# Patient Record
Sex: MALE | Race: WHITE | NOT HISPANIC OR LATINO | Employment: FULL TIME | URBAN - METROPOLITAN AREA
[De-identification: names, ages, dates, MRNs, and addresses within clinical notes are randomized per-mention and may not be internally consistent; named-entity substitution may affect disease eponyms.]

---

## 2018-05-11 ENCOUNTER — INITIAL CONSULT (OUTPATIENT)
Dept: VASCULAR SURGERY | Facility: CLINIC | Age: 68
End: 2018-05-11
Payer: MEDICARE

## 2018-05-11 VITALS
WEIGHT: 131.19 LBS | HEART RATE: 98 BPM | SYSTOLIC BLOOD PRESSURE: 155 MMHG | BODY MASS INDEX: 19.43 KG/M2 | DIASTOLIC BLOOD PRESSURE: 95 MMHG | HEIGHT: 69 IN | TEMPERATURE: 98 F

## 2018-05-11 DIAGNOSIS — Z01.818 PRE-OP EVALUATION: Primary | ICD-10-CM

## 2018-05-11 DIAGNOSIS — N18.4 CKD (CHRONIC KIDNEY DISEASE) STAGE 4, GFR 15-29 ML/MIN: ICD-10-CM

## 2018-05-11 DIAGNOSIS — I71.40 ABDOMINAL AORTIC ANEURYSM (AAA) WITHOUT RUPTURE: ICD-10-CM

## 2018-05-11 DIAGNOSIS — F17.210 CIGARETTE NICOTINE DEPENDENCE WITHOUT COMPLICATION: ICD-10-CM

## 2018-05-11 DIAGNOSIS — I71.40 AAA (ABDOMINAL AORTIC ANEURYSM) WITHOUT RUPTURE: Primary | ICD-10-CM

## 2018-05-11 DIAGNOSIS — I71.23 DESCENDING THORACIC AORTIC ANEURYSM: ICD-10-CM

## 2018-05-11 DIAGNOSIS — I10 ESSENTIAL HYPERTENSION: ICD-10-CM

## 2018-05-11 DIAGNOSIS — I71.20 THORACIC AORTIC ANEURYSM WITHOUT RUPTURE: Primary | ICD-10-CM

## 2018-05-11 PROCEDURE — 99205 OFFICE O/P NEW HI 60 MIN: CPT | Mod: S$PBB,,, | Performed by: SURGERY

## 2018-05-11 PROCEDURE — 99203 OFFICE O/P NEW LOW 30 MIN: CPT | Mod: PBBFAC | Performed by: SURGERY

## 2018-05-11 PROCEDURE — 99999 PR PBB SHADOW E&M-NEW PATIENT-LVL III: CPT | Mod: PBBFAC,,, | Performed by: SURGERY

## 2018-05-11 RX ORDER — LISINOPRIL 10 MG/1
10 TABLET ORAL
COMMUNITY
Start: 2017-08-01 | End: 2018-08-01

## 2018-05-11 RX ORDER — LIDOCAINE HYDROCHLORIDE 10 MG/ML
1 INJECTION, SOLUTION EPIDURAL; INFILTRATION; INTRACAUDAL; PERINEURAL ONCE
Status: CANCELLED | OUTPATIENT
Start: 2018-05-11 | End: 2018-05-11

## 2018-05-11 RX ORDER — MUPIROCIN 20 MG/G
OINTMENT TOPICAL
Status: CANCELLED | OUTPATIENT
Start: 2018-05-11

## 2018-05-11 NOTE — LETTER
May 11, 2018      Edda Gomez NP  [address] [facility] [place] [address]           [facility] - Vascular Surgery  [address]  [place] [address]  Phone: [phone]  Fax: [phone]          Patient: Oli Chris   MR Number: [id number]   YOB: 1950   Date of Visit: 5/11/2018       Dear Edda Gomez:    Thank you for referring Oli Chris to me for evaluation. Attached you will find relevant portions of my assessment and plan of care.    If you have questions, please do not hesitate to call me. I look forward to following Oli Chris along with you.    Sincerely,    LUIS ALBERTO Moss III, MD    Enclosure  CC:  No Recipients    If you would like to receive this communication electronically, please contact [email or web address] or [phone] to request more information on [employer] access.    For providers and/or their staff who would like to refer a patient to [facility], please contact us through our one-stop-shop provider referral line, [facility], at [phone].    If you feel you have received this communication in error or would no longer like to receive these types of communications, please e-mail [email or web address]

## 2018-05-11 NOTE — PROGRESS NOTES
lOi GALEANO Aries  05/11/2018     Referred by:  BRANDO Gomez (Vascular surgeon, [facility], [place])_    HPI:  Patient is a 68 y.o. male with a h/o HTN, chronic back pain, tobacco abuse who is here today for evaluation of synchronous thoracic and aortic aneurysm.   Patient visited PCP with throbbing lower back pain. He was evaluated with plain film and incidentally found to have thoracic aortic aneurysm.   Seen by Dr. Gomez in [place] and was referred for evaluation of 8.5 cm thoracic aortic aneurysm. Patient denies upper back pain.     Patient was evaluated with non-contrasted CT due to Cr. Of 1.9.    Notably, this throbbing lumbar back pain has been present ~6-8 wks, sometimes radiating down the R leg.  Has been medicating with high dose NSAIDS.   Also drips several POTS of cofee daily.    Works as .     no MI/stroke  Tobacco use: 1 PPD (50 PY)    PMH: HTN, tobacco abuse;   No hx of CAD/CP/MI    PSH: Tonsillectomy      No family history on file.  Social History     Social History    Marital status:      Spouse name: N/A    Number of children: N/A    Years of education: N/A     Occupational History    Not on file.     Social History Main Topics    Smoking status: Current Every Day Smoker     Types: Cigarettes    Smokeless tobacco: Not on file    Alcohol use No    Drug use: No    Sexual activity: Not Currently     Other Topics Concern    Not on file     Social History Narrative    No narrative on file     Review of patient's allergies indicates:  None      Current Outpatient Prescriptions:     lisinopril 10 MG tablet, Take 10 mg by mouth., Disp: , Rfl:     REVIEW OF SYSTEMS:  General: negative;   ENT: negative;   Allergy and Immunology: negative;   Hematological and Lymphatic: Negative;   Endocrine: negative;   Respiratory: no cough, shortness of breath, or wheezing;   Cardiovascular: no chest pain or dyspnea on exertion;   Gastrointestinal: no abdominal pain/back, change in bowel  habits, or bloody stools;   Genito-Urinary: no dysuria, trouble voiding, or hematuria;   Musculoskeletal: negative  Neurological: no TIA or stroke symptoms;   Psychiatric: no nervousness, anxiety or depression.    PHYSICAL EXAM:   Right Arm BP - Sittin/93 (18 0934)  Left Arm BP - Sittin/95 (18 0934)  Pulse: 98  Temp: 97.8 °F (36.6 °C)      General appearance:  Alert, well-appearing, and in no distress.  Oriented to person, place, and time   Neurological:  Normal speech, no focal findings noted; CN II - XII grossly intact           Musculoskeletal: Digits/nail without cyanosis/clubbing.  Normal muscle strength/tone.                 Neck: Supple, no significant adenopathy; thyroid is not enlarged      No carotid bruit can be auscultated                Chest:  Clear to auscultation, no wheezes, rales or rhonchi, symmetric air entry      No use of accessory muscles             Cardiac: Normal rate and regular rhythm, S1 and S2 normal; PMI non-displaced          Abdomen: Soft, nontender, nondistended, no masses or organomegaly      No rebound tenderness noted; bowel sounds normal      Pulsatile aortic mass is palpable.      No groin adenopathy      Extremities:   2+ femoral pulses bilaterally      Prominent bilateral popliteal artery pulses.      Bilateral pedal pulses palpable.      No pre-tibial edema      No ulcerations    LAB RESULTS: Outside Cr 1.9    IMAGING:  CT chest/abd/pelv non-contrasted - reviewed  Multiple synchronous aneurysms  ~9cm proximal descending thoracic aortic aneurysm   ~4cm saccular aneurysm at the level of diaphragmatic thu  ~5cm infrarenal aortic aneurysm with small right KRISTAN aneurysm.    IMP/PLAN:  68 y.o. male with HTN, Tobacco abuse with incidental finding of multiple synchronous aneurysm as mentioned above. Unfortunately due to his Cr. Of 1.9, these scans were done without contrast.     Phoenix Chang MD  Vascular/Endovascular Surgery Fellow    VASCULAR STAFF    I have  personally taken the history and examined this patient and agree with the resident's note as stated above.  Outside non-contrast CT personally reviewed    67 y/o male with synchronous upper mid thoracic fusiform aneurysm (9cm), much smaller terminal somewhat saccular aneurysm (~4-5cm), and 5.1 cm juxtrarenal AAA.  These are Asx.  His lumbar back pain with leg radiation most c/w musculoskeletal etiol.   There appears to be adequate landing zone prox and distal of the large TAA for endovascular Rx, but need to ensure with a cta.    Would not suggest Rx of all 3 aneurysms with a single open type II TAAA approach.    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  The significant renal dysfunction may be from his high dose NSAID use.    A/  Stop all NSAIDS and dehydrating major coffee intake, signif oral hydration with h2o over next week, then Re check Cr in ~ 1 wk in [facility].   Improvement of his renal insufficiency will make his surgery safer.  Tent plan CTA Chest/Abd Pelvis 5/22/18 if Cr has improved  Tentatively plan TEVAR 5/30/18    Over 45 min spent with the patient and wife    JHOAN Moss III, MD, FACS  Professor and Chief, Vascular and Endovascular Surgery

## 2018-05-21 ENCOUNTER — TELEPHONE (OUTPATIENT)
Dept: VASCULAR SURGERY | Facility: CLINIC | Age: 68
End: 2018-05-21

## 2018-05-21 NOTE — TELEPHONE ENCOUNTER
Reported BMP results to Dr. Moss. Dr. Moss ordered CTA to be changed to non-contrast CT chest/abd/pelvis. Notified patient's wife of change in orders, but that imaging time will remain at 1:45.     ----- Message from Ezra Ravi sent at 5/21/2018 12:05 PM CDT -----  Contact: Quinn- Wife  Caller said pt had blood work done on last Friday. Caller wants to know if the results show pt can have the CT scan tomorrow. Please call regarding this at [phone]

## 2018-05-22 ENCOUNTER — OFFICE VISIT (OUTPATIENT)
Dept: VASCULAR SURGERY | Facility: CLINIC | Age: 68
End: 2018-05-22
Payer: COMMERCIAL

## 2018-05-22 ENCOUNTER — HOSPITAL ENCOUNTER (OUTPATIENT)
Dept: RADIOLOGY | Facility: HOSPITAL | Age: 68
Discharge: HOME | End: 2018-05-22
Attending: SURGERY
Payer: COMMERCIAL

## 2018-05-22 ENCOUNTER — HOSPITAL ENCOUNTER (OUTPATIENT)
Dept: CARDIOLOGY | Facility: CLINIC | Age: 68
Discharge: HOME | End: 2018-05-22
Payer: COMMERCIAL

## 2018-05-22 VITALS
HEIGHT: 69 IN | HEART RATE: 92 BPM | DIASTOLIC BLOOD PRESSURE: 78 MMHG | TEMPERATURE: 98 F | WEIGHT: 131.19 LBS | BODY MASS INDEX: 19.43 KG/M2 | SYSTOLIC BLOOD PRESSURE: 125 MMHG

## 2018-05-22 DIAGNOSIS — I71.40 ABDOMINAL AORTIC ANEURYSM (AAA) WITHOUT RUPTURE: ICD-10-CM

## 2018-05-22 DIAGNOSIS — Z01.818 PRE-OP EVALUATION: Primary | ICD-10-CM

## 2018-05-22 DIAGNOSIS — I71.20 THORACIC AORTIC ANEURYSM WITHOUT RUPTURE: Primary | ICD-10-CM

## 2018-05-22 DIAGNOSIS — Z01.818 PRE-OP EVALUATION: ICD-10-CM

## 2018-05-22 DIAGNOSIS — J43.8 OTHER EMPHYSEMA: ICD-10-CM

## 2018-05-22 PROBLEM — J43.9 PULMONARY EMPHYSEMA: Status: ACTIVE | Noted: 2018-05-22

## 2018-05-22 PROCEDURE — 99215 OFFICE O/P EST HI 40 MIN: CPT | Mod: S$GLB,,, | Performed by: SURGERY

## 2018-05-22 PROCEDURE — 71250 CT THORAX DX C-: CPT | Mod: 26,,, | Performed by: RADIOLOGY

## 2018-05-22 PROCEDURE — 99999 PR PBB SHADOW E&M-EST. PATIENT-LVL III: CPT | Mod: PBBFAC,,, | Performed by: SURGERY

## 2018-05-22 PROCEDURE — 74176 CT ABD & PELVIS W/O CONTRAST: CPT | Mod: 26,,, | Performed by: RADIOLOGY

## 2018-05-22 PROCEDURE — 71046 X-RAY EXAM CHEST 2 VIEWS: CPT | Mod: 26,,, | Performed by: RADIOLOGY

## 2018-05-22 PROCEDURE — 74176 CT ABD & PELVIS W/O CONTRAST: CPT | Mod: TC

## 2018-05-22 PROCEDURE — 93000 ELECTROCARDIOGRAM COMPLETE: CPT | Mod: S$GLB,,, | Performed by: INTERNAL MEDICINE

## 2018-05-22 PROCEDURE — 71046 X-RAY EXAM CHEST 2 VIEWS: CPT | Mod: TC

## 2018-05-22 PROCEDURE — 71250 CT THORAX DX C-: CPT | Mod: TC

## 2018-05-22 RX ORDER — HYDROCODONE BITARTRATE AND ACETAMINOPHEN 7.5; 325 MG/1; MG/1
1 TABLET ORAL EVERY 6 HOURS PRN
Status: ON HOLD | COMMUNITY
End: 2018-06-02 | Stop reason: HOSPADM

## 2018-05-22 RX ORDER — TIOTROPIUM BROMIDE 18 UG/1
18 CAPSULE ORAL; RESPIRATORY (INHALATION) DAILY
COMMUNITY

## 2018-05-22 NOTE — PROGRESS NOTES
Oli GALEANO Aries  05/22/2018     Referred by:  BRANDO Gomez (Vascular surgeon, [facility], [place])_    HPI:  Patient is a 68 y.o. male with a h/o HTN, chronic back pain, tobacco abuse who is here today for evaluation of synchronous thoracic and aortic aneurysm.   Patient visited PCP with throbbing lower back pain. He was evaluated with plain film and incidentally found to have thoracic aortic aneurysm.   Seen by Dr. Gomez in [place] and was referred for evaluation of 8.5 cm thoracic aortic aneurysm. Patient denies upper back pain.     Patient was evaluated with non-contrasted CT due to Cr. Of 1.9.    Notably, this throbbing lumbar back pain has been present ~6-8 wks, sometimes radiating down the R leg.  Has been medicating with high dose NSAIDS.   Also drips several POTS of cofee daily.    Works as .     He now returns after stopping all NSAIDS x 10 days.  Repeat Cr 2.1 (outside)    no MI/stroke  Tobacco use: 1 PPD (50 PY)    PMH: HTN, tobacco abuse;   No hx of CAD/CP/MI    PSH: Tonsillectomy      No family history on file.  Social History     Social History    Marital status:      Spouse name: N/A    Number of children: N/A    Years of education: N/A     Occupational History    Not on file.     Social History Main Topics    Smoking status: Current Every Day Smoker     Types: Cigarettes    Smokeless tobacco: Not on file    Alcohol use No    Drug use: No    Sexual activity: Not Currently     Other Topics Concern    Not on file     Social History Narrative    No narrative on file     Review of patient's allergies indicates:  None      Current Outpatient Prescriptions:     HYDROcodone-acetaminophen (NORCO) 7.5-325 mg per tablet, Take 1 tablet by mouth every 6 (six) hours as needed for Pain., Disp: , Rfl:     tiotropium (SPIRIVA) 18 mcg inhalation capsule, Inhale 18 mcg into the lungs once daily. Controller, Disp: , Rfl:     lisinopril 10 MG tablet, Take 10 mg by mouth., Disp: , Rfl:      REVIEW OF SYSTEMS:  General: negative;   ENT: negative;   Allergy and Immunology: negative;   Hematological and Lymphatic: Negative;   Endocrine: negative;   Respiratory: no cough, shortness of breath, or wheezing;   Cardiovascular: no chest pain or dyspnea on exertion;   Gastrointestinal: no abdominal pain/back, change in bowel habits, or bloody stools;   Genito-Urinary: no dysuria, trouble voiding, or hematuria;   Musculoskeletal: negative  Neurological: no TIA or stroke symptoms;   Psychiatric: no nervousness, anxiety or depression.    PHYSICAL EXAM:      Pulse: 92  Temp: 97.6 °F (36.4 °C)      General appearance:  Alert, well-appearing, and in no distress.  Oriented to person, place, and time   Neurological:  Normal speech, no focal findings noted; CN II - XII grossly intact           Musculoskeletal: Digits/nail without cyanosis/clubbing.  Normal muscle strength/tone.                 Neck: Supple, no significant adenopathy; thyroid is not enlarged      No carotid bruit can be auscultated                Chest:  Clear to auscultation, no wheezes, rales or rhonchi, symmetric air entry      No use of accessory muscles             Cardiac: Normal rate and regular rhythm, S1 and S2 normal; PMI non-displaced          Abdomen: Soft, nontender, nondistended, no masses or organomegaly      No rebound tenderness noted; bowel sounds normal      Pulsatile aortic mass is palpable.      No groin adenopathy      Extremities:   2+ femoral pulses bilaterally      Prominent bilateral popliteal artery pulses.      Bilateral pedal pulses palpable.      No pre-tibial edema      No ulcerations    LAB RESULTS: Outside Cr 1.9    IMAGING:    CT today shows 32 mm prox thoracic, 8.5 cm TAA,  38 mm distal thoracic.  L CFA with less Ca++     CT chest/abd/pelv non-contrasted - reviewed  Multiple synchronous aneurysms  ~9cm proximal descending thoracic aortic aneurysm   ~4cm saccular aneurysm at the level of diaphragmatic thu  ~5cm  infrarenal aortic aneurysm with small right KRISTAN aneurysm.      ASSESSMENT:    1. Large TAA (8.5 cm) with synchronous smaller aortic aneurysms  2. CKD 4 preventing CTA    PLAN:    1. Percutaneous TEVAR 5/30/18, L CFA access,  will likely need 2 endografts to compensate for prox and distal landing zone side  2. Spinal drain to be placed by neuro IR that morning  3. Will limit IV contrast, but wsome use will be needed.  Cannot use CO2 given the proximity to head vessels    I have explained the risks (incl paralysis, renal failure), benefits and alternatives for this procedure in detail.  The patient and wife voices understanding and all questions have be answered, and agrees to proceed with the procedure.    Over 45 min spent with the patient and wife    JHOAN Moss III, MD, FACS  Professor and Chief, Vascular and Endovascular Surgery

## 2018-05-23 DIAGNOSIS — Z01.818 PRE-OP EVALUATION: Primary | ICD-10-CM

## 2018-05-28 ENCOUNTER — TELEPHONE (OUTPATIENT)
Dept: VASCULAR SURGERY | Facility: CLINIC | Age: 68
End: 2018-05-28
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
## 2018-05-29 ENCOUNTER — ANESTHESIA EVENT (OUTPATIENT)
Dept: SURGERY | Facility: HOSPITAL | Age: 68
DRG: 220 | End: 2018-05-29
Payer: MEDICARE

## 2018-05-29 ENCOUNTER — HOSPITAL ENCOUNTER (INPATIENT)
Facility: HOSPITAL | Age: 68
LOS: 4 days | Discharge: HOME | DRG: 220 | End: 2018-06-02
Attending: SURGERY | Admitting: SURGERY
Payer: MEDICARE

## 2018-05-29 DIAGNOSIS — I71.20 THORACIC AORTIC ANEURYSM WITHOUT RUPTURE: ICD-10-CM

## 2018-05-29 DIAGNOSIS — I71.23 DESCENDING THORACIC AORTIC ANEURYSM: ICD-10-CM

## 2018-05-29 DIAGNOSIS — I71.20 THORACIC AORTIC ANEURYSM: ICD-10-CM

## 2018-05-29 DIAGNOSIS — I71.60 THORACOABDOMINAL AORTIC ANEURYSM, WITHOUT RUPTURE: Primary | ICD-10-CM

## 2018-05-29 PROBLEM — E86.0 DEHYDRATION: Status: ACTIVE | Noted: 2018-05-29

## 2018-05-29 LAB
ABO + RH BLD: NORMAL
BLD GP AB SCN CELLS X3 SERPL QL: NORMAL

## 2018-05-29 PROCEDURE — 86850 RBC ANTIBODY SCREEN: CPT

## 2018-05-29 PROCEDURE — 36415 COLL VENOUS BLD VENIPUNCTURE: CPT

## 2018-05-29 PROCEDURE — 86920 COMPATIBILITY TEST SPIN: CPT

## 2018-05-29 PROCEDURE — 25000003 PHARM REV CODE 250: Performed by: STUDENT IN AN ORGANIZED HEALTH CARE EDUCATION/TRAINING PROGRAM

## 2018-05-29 PROCEDURE — 99223 1ST HOSP IP/OBS HIGH 75: CPT | Mod: 57,,, | Performed by: SURGERY

## 2018-05-29 PROCEDURE — A4216 STERILE WATER/SALINE, 10 ML: HCPCS | Performed by: STUDENT IN AN ORGANIZED HEALTH CARE EDUCATION/TRAINING PROGRAM

## 2018-05-29 PROCEDURE — 20600001 HC STEP DOWN PRIVATE ROOM

## 2018-05-29 RX ORDER — LABETALOL 100 MG/1
100 TABLET, FILM COATED ORAL EVERY 12 HOURS PRN
Status: DISCONTINUED | OUTPATIENT
Start: 2018-05-29 | End: 2018-05-30

## 2018-05-29 RX ORDER — ACETAMINOPHEN 325 MG/1
650 TABLET ORAL EVERY 4 HOURS PRN
Status: DISCONTINUED | OUTPATIENT
Start: 2018-05-29 | End: 2018-05-30

## 2018-05-29 RX ORDER — ONDANSETRON 8 MG/1
8 TABLET, ORALLY DISINTEGRATING ORAL EVERY 8 HOURS PRN
Status: DISCONTINUED | OUTPATIENT
Start: 2018-05-29 | End: 2018-06-02 | Stop reason: HOSPADM

## 2018-05-29 RX ORDER — SODIUM CHLORIDE 9 MG/ML
INJECTION, SOLUTION INTRAVENOUS CONTINUOUS
Status: DISCONTINUED | OUTPATIENT
Start: 2018-05-29 | End: 2018-05-30

## 2018-05-29 RX ORDER — SODIUM CHLORIDE 0.9 % (FLUSH) 0.9 %
3 SYRINGE (ML) INJECTION EVERY 8 HOURS
Status: DISCONTINUED | OUTPATIENT
Start: 2018-05-29 | End: 2018-05-30

## 2018-05-29 RX ORDER — HYDROCODONE BITARTRATE AND ACETAMINOPHEN 5; 325 MG/1; MG/1
1 TABLET ORAL EVERY 4 HOURS PRN
Status: DISCONTINUED | OUTPATIENT
Start: 2018-05-29 | End: 2018-05-30

## 2018-05-29 RX ADMIN — SODIUM CHLORIDE: 0.9 INJECTION, SOLUTION INTRAVENOUS at 11:05

## 2018-05-29 RX ADMIN — HYDROCODONE BITARTRATE AND ACETAMINOPHEN 1 TABLET: 5; 325 TABLET ORAL at 04:05

## 2018-05-29 RX ADMIN — SODIUM CHLORIDE: 0.9 INJECTION, SOLUTION INTRAVENOUS at 08:05

## 2018-05-29 RX ADMIN — Medication 3 ML: at 02:05

## 2018-05-29 NOTE — H&P
Inpatient Radiology Pre-procedure Note    History of Present Illness:  Oli Chris is a 68 y.o. male with thoracic aortic aneurysm admitted for TEVAR.  Spinal drain requested.    Admission H&P reviewed.  History reviewed. No pertinent past medical history.  History reviewed. No pertinent surgical history.    Review of Systems:   As documented in primary team H&P    Home Meds:   Prior to Admission medications    Medication Sig Start Date End Date Taking? Authorizing Provider   HYDROcodone-acetaminophen (NORCO) 7.5-325 mg per tablet Take 1 tablet by mouth every 6 (six) hours as needed for Pain.    Historical Provider, MD   lisinopril 10 MG tablet Take 10 mg by mouth. 8/1/17 8/1/18  Historical Provider, MD   tiotropium (SPIRIVA) 18 mcg inhalation capsule Inhale 18 mcg into the lungs once daily. Controller    Historical Provider, MD     Scheduled Meds:    sodium chloride 0.9%  3 mL Intravenous Q8H     Continuous Infusions:    sodium chloride 0.9% 125 mL/hr at 05/29/18 1159     PRN Meds:acetaminophen, HYDROcodone-acetaminophen, labetalol, ondansetron, pneumoc 13-tasha conj-dip cr(PF), promethazine (PHENERGAN) IVPB  Anticoagulants/Antiplatelets: no anticoagulation    Allergies: Review of patient's allergies indicates:  No Known Allergies  Sedation Hx: have not been any systemic reactions    Labs:  No results for input(s): INR in the last 168 hours.    Invalid input(s):  PT,  PTT  No results for input(s): WBC, HGB, HCT, MCV, PLT in the last 168 hours. No results for input(s): GLU, NA, K, CL, CO2, BUN, CREATININE, CALCIUM, MG, ALT, AST, ALBUMIN, BILITOT, BILIDIR in the last 168 hours.      Vitals:  Temp: 97.9 °F (36.6 °C) (05/29/18 1138)  Pulse: 93 (05/29/18 1138)  Resp: 18 (05/29/18 1138)  BP: (!) 154/92 (05/29/18 1138)  SpO2: 96 % (05/29/18 1138)     Physical Exam:  ASA: 3  Mallampati: 2    General: no acute distress  Mental Status: alert and oriented to person, place and time  HEENT: normocephalic,  atraumatic  Chest: unlabored breathing  Heart: regular heart rate  Abdomen: nondistended  Extremity: moves all extremities    Assessment / Plan: 68 year old male with thoracic aortic aneurysm.    Spinal drain to be placed in AM.    Sedation Plan: moderate    Rod Linder MD  Neurointerventional Fellow  Department of Radiology  [phone]

## 2018-05-29 NOTE — PLAN OF CARE
Problem: Patient Care Overview  Goal: Plan of Care Review  Outcome: Ongoing (interventions implemented as appropriate)  Plan of Care reviewed w/ pt and spouse at bedside. AVSS on RA. PRN PO [place], available for SBP >170. Voiding CYU in urinal. Ambulating ad kalen in room. NPO at MN for TAA repair tomorrow. PRN Norco available for pain w/ effect.

## 2018-05-29 NOTE — H&P
History & Physical  Surgery      SUBJECTIVE:     Chief Complaint/Reason for Admission: AAA, Dehydration    History of Present Illness:   Patient is a 68 y.o. male with a h/o HTN, chronic back pain, tobacco abuse who is here today for evaluation of synchronous thoracic and aortic aneurysm.   Patient visited PCP with throbbing lower back pain. He was evaluated with plain film and incidentally found to have thoracic aortic aneurysm.   Seen by Dr. Gomez in [place] and was referred for evaluation of 8.5 cm thoracic aortic aneurysm. Patient denies upper back pain.      Patient was evaluated with non-contrasted CT due to Cr. Of 1.9.     Notably, this throbbing lumbar back pain has been present ~6-8 wks, sometimes radiating down the R leg.  Has been medicating with high dose NSAIDS.   Also drinks several POTS of cofee daily.     Interval:  He was admitted ahead of TEVAR tomorrow for increased Cr and dehydration. He denies recent F/C, illness, nausea, vomiting, SOB, lightheadedness or D/C.      No current facility-administered medications on file prior to encounter.      Current Outpatient Prescriptions on File Prior to Encounter   Medication Sig    HYDROcodone-acetaminophen (NORCO) 7.5-325 mg per tablet Take 1 tablet by mouth every 6 (six) hours as needed for Pain.    lisinopril 10 MG tablet Take 10 mg by mouth.    tiotropium (SPIRIVA) 18 mcg inhalation capsule Inhale 18 mcg into the lungs once daily. Controller       Review of patient's allergies indicates:  No Known Allergies    No past medical history on file.  No past surgical history on file.  No family history on file.  Social History   Substance Use Topics    Smoking status: Current Every Day Smoker     Types: Cigarettes    Smokeless tobacco: Not on file    Alcohol use No        Review of Systems   Reviewed, otherwise negative  OBJECTIVE:     Vital Signs (Most Recent)  Temp: 97.9 °F (36.6 °C) (05/29/18 1138)  Pulse: 93 (05/29/18 1138)  Resp: 18 (05/29/18  1138)  BP: (!) 154/92 (05/29/18 1138)  SpO2: 96 % (05/29/18 1138)    Physical Exam   A&O, NAD  RRR  No Resp Distress  ABD: s/nt/nd    Laboratory  CMP:   Recent Labs  Lab 05/22/18  1539   GLU 92   CALCIUM 9.5   *   K 4.6   CO2 24      BUN 41*   CREATININE 2.4*         ASSESSMENT/PLAN:     A/P:  69 y/o M with Thoracic and Abdominal Aortic aneurysms with planned TEVAR tomorrow. Admitted for elevated Cr and dehydration    - Admit to Vascular  - NS at 125cc/hr  - CMP in the morning  - Regular Diet, NPO at midnight  - Will need IR spinal drain tomorrow morning as scheduled  - Will d/w with Staff. Still booked for TEVAR as of right now    Ranjeet Colindres MD   [phone]  General Surgery PGY-I  [facility]

## 2018-05-29 NOTE — ANESTHESIA PREPROCEDURE EVALUATION
[facility]  Anesthesia Pre-Operative Evaluation         Patient Name: Oli Chris  YOB: 1950  MRN: [id number]    SUBJECTIVE:     Pre-operative evaluation for Procedure(s) (LRB):  REPAIR THORACIC-AORTIC ANEURYSM-ENDOVASCULAR GRAFT (TAA) (N/A)     05/29/2018    Oli Chris is a 68 y.o. male w/ a significant PMHx of HTN, CKD, COPD, chronic back pain, and tobacco abuse who presented after being seen by his PCP where he was evaluated for throbbing lower back pain. Plain film xray was performed and he was found to have a thoracic aortic aneurysm. He was admitted for pre op optimization 2/2 having been found to have an increased Cr (2.4 on BMP 5/22, no prior labs available). He is currently receiving NS at 125 cc/hr. Plan is for spinal drain placement by IR prior to his procedure.     Patient now presents for the above procedure(s).      LDA:        Peripheral IV - Single Lumen 05/29/18 1153 Right Forearm (Active)   Number of days: 0       Prev airway: None documented.    Drips: Normal Saline at 125 cc /hr.       Patient Active Problem List   Diagnosis    Thoracic aortic aneurysm without rupture    Abdominal aortic aneurysm (AAA) without rupture    Essential hypertension    Cigarette nicotine dependence without complication    CKD (chronic kidney disease) stage 4, GFR 15-29 ml/min    Pulmonary emphysema    Thoracic aortic aneurysm    Dehydration       Review of patient's allergies indicates:  No Known Allergies    Current Inpatient Medications:      No current facility-administered medications on file prior to encounter.      Current Outpatient Prescriptions on File Prior to Encounter   Medication Sig Dispense Refill    lisinopril 10 MG tablet Take 10 mg by mouth.         No past surgical history on file.    Social History     Social History    Marital status:      Spouse name: N/A    Number of children: N/A    Years of education: N/A     Occupational  History    Not on file.     Social History Main Topics    Smoking status: Current Every Day Smoker     Types: Cigarettes    Smokeless tobacco: Not on file    Alcohol use No    Drug use: No    Sexual activity: Not Currently     Other Topics Concern    Not on file     Social History Narrative    No narrative on file       OBJECTIVE:     Vital Signs Range (Last 24H):  Temp:  [36.6 °C (97.9 °F)]   Pulse:  [93]   Resp:  [18]   BP: (154)/(92)   SpO2:  [96 %]       CBC:   Lab Results   Component Value Date    WBC 10.93 2018    HGB 13.9 (L) 2018    HCT 41.8 2018    MCV 91 2018     2018       CMP:   CMP  Sodium   Date Value Ref Range Status   2018 133 (L) 136 - 145 mmol/L Final     Potassium   Date Value Ref Range Status   2018 4.6 3.5 - 5.1 mmol/L Final     Chloride   Date Value Ref Range Status   2018 101 95 - 110 mmol/L Final     CO2   Date Value Ref Range Status   2018 24 23 - 29 mmol/L Final     Glucose   Date Value Ref Range Status   2018 92 70 - 110 mg/dL Final     BUN, Bld   Date Value Ref Range Status   2018 41 (H) 8 - 23 mg/dL Final     Creatinine   Date Value Ref Range Status   2018 2.4 (H) 0.5 - 1.4 mg/dL Final     Calcium   Date Value Ref Range Status   2018 9.5 8.7 - 10.5 mg/dL Final     Anion Gap   Date Value Ref Range Status   2018 8 8 - 16 mmol/L Final     eGFR if    Date Value Ref Range Status   2018 31.3 (A) >60 mL/min/1.73 m^2 Final     eGFR if non    Date Value Ref Range Status   2018 27.1 (A) >60 mL/min/1.73 m^2 Final     Comment:     Calculation used to obtain the estimated glomerular filtration  rate (eGFR) is the CKD-EPI equation.          INR:  No results for input(s): PT, INR, PROTIME, APTT in the last 72 hours.    Diagnostic Studies:     EK/22/18    Test Reason : Z01.818,  Vent. Rate : 065 BPM     Atrial Rate : 065 BPM     P-R Int : 148 ms           QRS Dur : 082 ms      QT Int : 376 ms       P-R-T Axes : 007 032 066 degrees     QTc Int : 391 ms    Normal sinus rhythm  Normal ECG  No previous ECGs available  Confirmed by Yohannes HAY, Yoav Anders ([id number]) on 5/23/2018 8:43:42 AM    Referred By: LUIS ALBERTO PUGA           Confirmed By:Yoav     2D ECHO:  No results found for this or any previous visit.      ASSESSMENT/PLAN:         Anesthesia Evaluation    I have reviewed the Patient Summary Reports.    I have reviewed the Nursing Notes.      Review of Systems  Anesthesia Hx:  History of prior surgery of interest to airway management or planning: Denies Family Hx of Anesthesia complications.   Denies Personal Hx of Anesthesia complications.   Social:  Smoker    Hematology/Oncology:  Hematology Normal   Oncology Normal     Cardiovascular:   Hypertension    Pulmonary:   COPD    Renal/:   Chronic Renal Disease, CRI    Hepatic/GI:  Hepatic/GI Normal    Musculoskeletal:  Spine Disorders: lumbar Chronic Pain    Neurological:   Chronic Pain Syndrome   Endocrine:  Endocrine Normal    Dermatological:  Skin Normal    Psych:  Psychiatric Normal           Physical Exam  General:  Well nourished    Airway/Jaw/Neck:  Airway Findings: Mouth Opening: Normal Tongue: Normal  General Airway Assessment: Adult  Mallampati: II  TM Distance: Normal, at least 6 cm  Jaw/Neck Findings:  Neck ROM: Normal ROM  Neck Findings: Normal    Eyes/Ears/Nose:  EYES/EARS/NOSE FINDINGS: Normal   Dental:  Dental Findings: Periodontal disease, Severe    Chest/Lungs:  Chest/Lungs Findings: Clear to auscultation, Normal Respiratory Rate     Heart/Vascular:  Heart Findings: Rate: Normal  Rhythm: Regular Rhythm  Sounds: Normal  Heart murmur: negative    Abdomen:  Abdomen Findings: Normal    Musculoskeletal:  Musculoskeletal Findings: Normal   Skin:  Skin Findings: Normal    Mental Status:  Mental Status Findings:  Cooperative, Alert and Oriented         Anesthesia Plan  Type of Anesthesia, risks &  benefits discussed:  Anesthesia Type:  general, MAC  Patient's Preference:   Intra-op Monitoring Plan: standard ASA monitors and arterial line  Intra-op Monitoring Plan Comments:   Post Op Pain Control Plan: multimodal analgesia and per primary service following discharge from PACU  Post Op Pain Control Plan Comments:   Induction:   IV  Beta Blocker:  Patient is not currently on a Beta-Blocker (No further documentation required).       Informed Consent: Patient understands risks and agrees with Anesthesia plan.  Questions answered. Anesthesia consent signed with patient.  ASA Score: 4     Day of Surgery Review of History & Physical:  There are no significant changes.  H&P update referred to the surgeon.         Ready For Surgery From Anesthesia Perspective.

## 2018-05-30 ENCOUNTER — ANESTHESIA (OUTPATIENT)
Dept: SURGERY | Facility: HOSPITAL | Age: 68
DRG: 220 | End: 2018-05-30
Payer: MEDICARE

## 2018-05-30 ENCOUNTER — SURGERY (OUTPATIENT)
Age: 68
End: 2018-05-30

## 2018-05-30 PROBLEM — I71.20 THORACIC AORTIC ANEURYSM: Status: ACTIVE | Noted: 2018-05-30

## 2018-05-30 PROBLEM — I71.23 DESCENDING THORACIC AORTIC ANEURYSM: Status: ACTIVE | Noted: 2018-05-30

## 2018-05-30 PROBLEM — J43.9 PULMONARY EMPHYSEMA: Chronic | Status: ACTIVE | Noted: 2018-05-22

## 2018-05-30 PROBLEM — R52 PAIN: Status: ACTIVE | Noted: 2018-05-30

## 2018-05-30 PROBLEM — I71.60 THORACOABDOMINAL AORTIC ANEURYSM, WITHOUT RUPTURE: Status: ACTIVE | Noted: 2018-05-11

## 2018-05-30 PROBLEM — N18.4 CKD (CHRONIC KIDNEY DISEASE) STAGE 4, GFR 15-29 ML/MIN: Chronic | Status: ACTIVE | Noted: 2018-05-11

## 2018-05-30 LAB
ALBUMIN SERPL BCP-MCNC: 2.7 G/DL
ALP SERPL-CCNC: 164 U/L
ALT SERPL W/O P-5'-P-CCNC: 17 U/L
ANION GAP SERPL CALC-SCNC: 7 MMOL/L
AST SERPL-CCNC: 28 U/L
BASOPHILS # BLD AUTO: 0.13 K/UL
BASOPHILS NFR BLD: 1.4 %
BILIRUB SERPL-MCNC: 0.6 MG/DL
BUN SERPL-MCNC: 30 MG/DL
CALCIUM SERPL-MCNC: 8.7 MG/DL
CHLORIDE SERPL-SCNC: 108 MMOL/L
CO2 SERPL-SCNC: 22 MMOL/L
CREAT SERPL-MCNC: 2.2 MG/DL
DIFFERENTIAL METHOD: ABNORMAL
EOSINOPHIL # BLD AUTO: 1.3 K/UL
EOSINOPHIL NFR BLD: 14 %
ERYTHROCYTE [DISTWIDTH] IN BLOOD BY AUTOMATED COUNT: 13.4 %
EST. GFR  (AFRICAN AMERICAN): 34.8 ML/MIN/1.73 M^2
EST. GFR  (NON AFRICAN AMERICAN): 30.1 ML/MIN/1.73 M^2
GLUCOSE SERPL-MCNC: 102 MG/DL
HCT VFR BLD AUTO: 37.9 %
HGB BLD-MCNC: 12.2 G/DL
IMM GRANULOCYTES # BLD AUTO: 0.02 K/UL
IMM GRANULOCYTES NFR BLD AUTO: 0.2 %
INR PPP: 1.3
LYMPHOCYTES # BLD AUTO: 2 K/UL
LYMPHOCYTES NFR BLD: 20.9 %
MAGNESIUM SERPL-MCNC: 1.9 MG/DL
MAGNESIUM SERPL-MCNC: 2 MG/DL
MCH RBC QN AUTO: 28.7 PG
MCHC RBC AUTO-ENTMCNC: 32.2 G/DL
MCV RBC AUTO: 89 FL
MONOCYTES # BLD AUTO: 0.8 K/UL
MONOCYTES NFR BLD: 8.6 %
NEUTROPHILS # BLD AUTO: 5.2 K/UL
NEUTROPHILS NFR BLD: 54.9 %
NRBC BLD-RTO: 0 /100 WBC
PHOSPHATE SERPL-MCNC: 3.8 MG/DL
PHOSPHATE SERPL-MCNC: 3.8 MG/DL
PLATELET # BLD AUTO: 217 K/UL
PMV BLD AUTO: 8.9 FL
POCT GLUCOSE: 118 MG/DL (ref 70–110)
POTASSIUM SERPL-SCNC: 4.8 MMOL/L
PROT SERPL-MCNC: 6.6 G/DL
PROTHROMBIN TIME: 13.4 SEC
RBC # BLD AUTO: 4.25 M/UL
SODIUM SERPL-SCNC: 137 MMOL/L
WBC # BLD AUTO: 9.41 K/UL

## 2018-05-30 PROCEDURE — 02VX3DZ RESTRICTION OF THORACIC AORTA, ASCENDING/ARCH WITH INTRALUMINAL DEVICE, PERCUTANEOUS APPROACH: ICD-10-PCS | Performed by: SURGERY

## 2018-05-30 PROCEDURE — 63600175 PHARM REV CODE 636 W HCPCS: Performed by: NURSE ANESTHETIST, CERTIFIED REGISTERED

## 2018-05-30 PROCEDURE — 36200 PLACE CATHETER IN AORTA: CPT | Mod: 50,51,, | Performed by: SURGERY

## 2018-05-30 PROCEDURE — 83735 ASSAY OF MAGNESIUM: CPT

## 2018-05-30 PROCEDURE — 27201037 HC PRESSURE MONITORING SET UP

## 2018-05-30 PROCEDURE — 009Y30Z DRAINAGE OF LUMBAR SPINAL CORD WITH DRAINAGE DEVICE, PERCUTANEOUS APPROACH: ICD-10-PCS | Performed by: PSYCHIATRY & NEUROLOGY

## 2018-05-30 PROCEDURE — C1874 STENT, COATED/COV W/DEL SYS: HCPCS | Performed by: SURGERY

## 2018-05-30 PROCEDURE — 25000003 PHARM REV CODE 250: Performed by: STUDENT IN AN ORGANIZED HEALTH CARE EDUCATION/TRAINING PROGRAM

## 2018-05-30 PROCEDURE — 37000008 HC ANESTHESIA 1ST 15 MINUTES: Performed by: SURGERY

## 2018-05-30 PROCEDURE — 80053 COMPREHEN METABOLIC PANEL: CPT

## 2018-05-30 PROCEDURE — 25000003 PHARM REV CODE 250: Performed by: SURGERY

## 2018-05-30 PROCEDURE — 36620 INSERTION CATHETER ARTERY: CPT | Mod: 59,,, | Performed by: ANESTHESIOLOGY

## 2018-05-30 PROCEDURE — 63600175 PHARM REV CODE 636 W HCPCS: Performed by: SURGERY

## 2018-05-30 PROCEDURE — 25000242 PHARM REV CODE 250 ALT 637 W/ HCPCS: Performed by: SURGERY

## 2018-05-30 PROCEDURE — 34713 PERQ ACCESS & CLSR FEM ART: CPT | Mod: LT,,, | Performed by: SURGERY

## 2018-05-30 PROCEDURE — 71000033 HC RECOVERY, INTIAL HOUR: Performed by: SURGERY

## 2018-05-30 PROCEDURE — 25500020 PHARM REV CODE 255: Performed by: SURGERY

## 2018-05-30 PROCEDURE — C1887 CATHETER, GUIDING: HCPCS | Performed by: SURGERY

## 2018-05-30 PROCEDURE — 99223 1ST HOSP IP/OBS HIGH 75: CPT | Mod: ,,, | Performed by: PHYSICIAN ASSISTANT

## 2018-05-30 PROCEDURE — 25000003 PHARM REV CODE 250: Performed by: NURSE ANESTHETIST, CERTIFIED REGISTERED

## 2018-05-30 PROCEDURE — 63600175 PHARM REV CODE 636 W HCPCS: Performed by: PSYCHIATRY & NEUROLOGY

## 2018-05-30 PROCEDURE — 36000712 HC OR TIME LEV V 1ST 15 MIN: Performed by: SURGERY

## 2018-05-30 PROCEDURE — 20000000 HC ICU ROOM

## 2018-05-30 PROCEDURE — 27201423 OPTIME MED/SURG SUP & DEVICES STERILE SUPPLY: Performed by: SURGERY

## 2018-05-30 PROCEDURE — C1753 CATH, INTRAVAS ULTRASOUND: HCPCS | Performed by: SURGERY

## 2018-05-30 PROCEDURE — 36415 COLL VENOUS BLD VENIPUNCTURE: CPT

## 2018-05-30 PROCEDURE — C1769 GUIDE WIRE: HCPCS | Performed by: SURGERY

## 2018-05-30 PROCEDURE — 85610 PROTHROMBIN TIME: CPT

## 2018-05-30 PROCEDURE — 84100 ASSAY OF PHOSPHORUS: CPT | Mod: 91

## 2018-05-30 PROCEDURE — C1894 INTRO/SHEATH, NON-LASER: HCPCS | Performed by: SURGERY

## 2018-05-30 PROCEDURE — 27000221 HC OXYGEN, UP TO 24 HOURS

## 2018-05-30 PROCEDURE — 85025 COMPLETE CBC W/AUTO DIFF WBC: CPT

## 2018-05-30 PROCEDURE — B41D1ZZ FLUOROSCOPY OF AORTA AND BILATERAL LOWER EXTREMITY ARTERIES USING LOW OSMOLAR CONTRAST: ICD-10-PCS | Performed by: SURGERY

## 2018-05-30 PROCEDURE — 75957 PR  ENDOVASC REPAIR THOR AORTA EXCL SUBCLAVIAN: CPT | Mod: 26,,, | Performed by: SURGERY

## 2018-05-30 PROCEDURE — 36000713 HC OR TIME LEV V EA ADD 15 MIN: Performed by: SURGERY

## 2018-05-30 PROCEDURE — D9220A PRA ANESTHESIA: Mod: CRNA,,, | Performed by: NURSE ANESTHETIST, CERTIFIED REGISTERED

## 2018-05-30 PROCEDURE — 25000003 PHARM REV CODE 250: Performed by: FAMILY MEDICINE

## 2018-05-30 PROCEDURE — 37000009 HC ANESTHESIA EA ADD 15 MINS: Performed by: SURGERY

## 2018-05-30 PROCEDURE — D9220A PRA ANESTHESIA: Mod: ANES,,, | Performed by: ANESTHESIOLOGY

## 2018-05-30 PROCEDURE — 33881 EVASC RPR TA NDGFT XCOV LSA: CPT | Mod: ,,, | Performed by: SURGERY

## 2018-05-30 PROCEDURE — 94761 N-INVAS EAR/PLS OXIMETRY MLT: CPT

## 2018-05-30 PROCEDURE — C1760 CLOSURE DEV, VASC: HCPCS | Performed by: SURGERY

## 2018-05-30 PROCEDURE — 71000039 HC RECOVERY, EACH ADD'L HOUR: Performed by: SURGERY

## 2018-05-30 RX ORDER — ROCURONIUM BROMIDE 10 MG/ML
INJECTION, SOLUTION INTRAVENOUS
Status: DISCONTINUED | OUTPATIENT
Start: 2018-05-30 | End: 2018-05-30

## 2018-05-30 RX ORDER — LIDOCAINE HCL/PF 100 MG/5ML
SYRINGE (ML) INTRAVENOUS
Status: DISCONTINUED | OUTPATIENT
Start: 2018-05-30 | End: 2018-05-30

## 2018-05-30 RX ORDER — MUPIROCIN 20 MG/G
1 OINTMENT TOPICAL 2 TIMES DAILY
Status: DISCONTINUED | OUTPATIENT
Start: 2018-05-30 | End: 2018-06-02 | Stop reason: HOSPADM

## 2018-05-30 RX ORDER — HEPARIN SODIUM 1000 [USP'U]/ML
INJECTION, SOLUTION INTRAVENOUS; SUBCUTANEOUS
Status: DISCONTINUED | OUTPATIENT
Start: 2018-05-30 | End: 2018-05-30

## 2018-05-30 RX ORDER — AMOXICILLIN 250 MG
1 CAPSULE ORAL DAILY PRN
Status: DISCONTINUED | OUTPATIENT
Start: 2018-05-30 | End: 2018-06-02 | Stop reason: HOSPADM

## 2018-05-30 RX ORDER — MORPHINE SULFATE 1 MG/ML
INJECTION INTRAVENOUS CONTINUOUS
Status: DISCONTINUED | OUTPATIENT
Start: 2018-05-30 | End: 2018-06-01

## 2018-05-30 RX ORDER — LIDOCAINE HYDROCHLORIDE 10 MG/ML
1 INJECTION, SOLUTION EPIDURAL; INFILTRATION; INTRACAUDAL; PERINEURAL ONCE
Status: DISCONTINUED | OUTPATIENT
Start: 2018-05-30 | End: 2018-05-30

## 2018-05-30 RX ORDER — HYDRALAZINE HYDROCHLORIDE 20 MG/ML
INJECTION INTRAMUSCULAR; INTRAVENOUS
Status: DISCONTINUED | OUTPATIENT
Start: 2018-05-30 | End: 2018-05-30

## 2018-05-30 RX ORDER — IODIXANOL 320 MG/ML
INJECTION, SOLUTION INTRAVASCULAR
Status: DISCONTINUED | OUTPATIENT
Start: 2018-05-30 | End: 2018-05-30 | Stop reason: HOSPADM

## 2018-05-30 RX ORDER — SODIUM CHLORIDE 9 MG/ML
INJECTION, SOLUTION INTRAVENOUS CONTINUOUS
Status: DISCONTINUED | OUTPATIENT
Start: 2018-05-30 | End: 2018-05-30

## 2018-05-30 RX ORDER — MIDAZOLAM HYDROCHLORIDE 1 MG/ML
INJECTION, SOLUTION INTRAMUSCULAR; INTRAVENOUS
Status: DISCONTINUED | OUTPATIENT
Start: 2018-05-30 | End: 2018-05-30

## 2018-05-30 RX ORDER — CEFAZOLIN SODIUM 1 G/3ML
2 INJECTION, POWDER, FOR SOLUTION INTRAMUSCULAR; INTRAVENOUS
Status: COMPLETED | OUTPATIENT
Start: 2018-05-30 | End: 2018-05-31

## 2018-05-30 RX ORDER — FENTANYL CITRATE 50 UG/ML
INJECTION, SOLUTION INTRAMUSCULAR; INTRAVENOUS
Status: DISCONTINUED | OUTPATIENT
Start: 2018-05-30 | End: 2018-05-30

## 2018-05-30 RX ORDER — SODIUM CHLORIDE 9 MG/ML
500 INJECTION, SOLUTION INTRAVENOUS ONCE
Status: COMPLETED | OUTPATIENT
Start: 2018-05-30 | End: 2018-05-30

## 2018-05-30 RX ORDER — ACETAMINOPHEN 10 MG/ML
1000 INJECTION, SOLUTION INTRAVENOUS EVERY 8 HOURS
Status: COMPLETED | OUTPATIENT
Start: 2018-05-30 | End: 2018-06-01

## 2018-05-30 RX ORDER — TIOTROPIUM BROMIDE 18 UG/1
18 CAPSULE ORAL; RESPIRATORY (INHALATION) DAILY
Status: DISCONTINUED | OUTPATIENT
Start: 2018-05-30 | End: 2018-06-02 | Stop reason: HOSPADM

## 2018-05-30 RX ORDER — LORAZEPAM 0.5 MG/1
0.5 TABLET ORAL ONCE
Status: COMPLETED | OUTPATIENT
Start: 2018-05-30 | End: 2018-05-30

## 2018-05-30 RX ORDER — LABETALOL HYDROCHLORIDE 5 MG/ML
INJECTION, SOLUTION INTRAVENOUS
Status: DISCONTINUED | OUTPATIENT
Start: 2018-05-30 | End: 2018-05-30

## 2018-05-30 RX ORDER — HEPARIN SODIUM 1000 [USP'U]/ML
INJECTION, SOLUTION INTRAVENOUS; SUBCUTANEOUS
Status: DISCONTINUED | OUTPATIENT
Start: 2018-05-30 | End: 2018-05-30 | Stop reason: HOSPADM

## 2018-05-30 RX ORDER — CYCLOBENZAPRINE HCL 5 MG
5 TABLET ORAL 3 TIMES DAILY PRN
Status: DISCONTINUED | OUTPATIENT
Start: 2018-05-30 | End: 2018-06-02 | Stop reason: HOSPADM

## 2018-05-30 RX ORDER — MORPHINE SULFATE 1 MG/ML
INJECTION INTRAVENOUS
Status: DISPENSED
Start: 2018-05-30 | End: 2018-05-31

## 2018-05-30 RX ORDER — FENTANYL CITRATE 50 UG/ML
INJECTION, SOLUTION INTRAMUSCULAR; INTRAVENOUS CODE/TRAUMA/SEDATION MEDICATION
Status: COMPLETED | OUTPATIENT
Start: 2018-05-30 | End: 2018-05-30

## 2018-05-30 RX ORDER — CEFAZOLIN SODIUM 1 G/3ML
2 INJECTION, POWDER, FOR SOLUTION INTRAMUSCULAR; INTRAVENOUS
Status: DISCONTINUED | OUTPATIENT
Start: 2018-05-30 | End: 2018-05-30

## 2018-05-30 RX ORDER — PHENYLEPHRINE HYDROCHLORIDE 10 MG/ML
INJECTION INTRAVENOUS
Status: DISCONTINUED | OUTPATIENT
Start: 2018-05-30 | End: 2018-05-30

## 2018-05-30 RX ORDER — LORAZEPAM 0.5 MG/1
TABLET ORAL
Status: DISPENSED
Start: 2018-05-30 | End: 2018-05-31

## 2018-05-30 RX ORDER — CEFAZOLIN SODIUM 1 G/3ML
INJECTION, POWDER, FOR SOLUTION INTRAMUSCULAR; INTRAVENOUS
Status: DISCONTINUED | OUTPATIENT
Start: 2018-05-30 | End: 2018-05-30

## 2018-05-30 RX ORDER — NALOXONE HCL 0.4 MG/ML
0.02 VIAL (ML) INJECTION
Status: DISCONTINUED | OUTPATIENT
Start: 2018-05-30 | End: 2018-06-01

## 2018-05-30 RX ORDER — FENTANYL CITRATE 50 UG/ML
50 INJECTION, SOLUTION INTRAMUSCULAR; INTRAVENOUS
Status: DISCONTINUED | OUTPATIENT
Start: 2018-05-30 | End: 2018-05-30

## 2018-05-30 RX ORDER — MUPIROCIN 20 MG/G
OINTMENT TOPICAL
Status: DISCONTINUED | OUTPATIENT
Start: 2018-05-30 | End: 2018-05-30

## 2018-05-30 RX ORDER — PROTAMINE SULFATE 10 MG/ML
INJECTION, SOLUTION INTRAVENOUS
Status: DISCONTINUED | OUTPATIENT
Start: 2018-05-30 | End: 2018-05-30

## 2018-05-30 RX ORDER — MIDAZOLAM HYDROCHLORIDE 1 MG/ML
1 INJECTION INTRAMUSCULAR; INTRAVENOUS
Status: DISCONTINUED | OUTPATIENT
Start: 2018-05-30 | End: 2018-05-30

## 2018-05-30 RX ORDER — MIDAZOLAM HYDROCHLORIDE 1 MG/ML
INJECTION INTRAMUSCULAR; INTRAVENOUS CODE/TRAUMA/SEDATION MEDICATION
Status: COMPLETED | OUTPATIENT
Start: 2018-05-30 | End: 2018-05-30

## 2018-05-30 RX ORDER — PROPOFOL 10 MG/ML
VIAL (ML) INTRAVENOUS
Status: DISCONTINUED | OUTPATIENT
Start: 2018-05-30 | End: 2018-05-30

## 2018-05-30 RX ORDER — SODIUM CHLORIDE 9 MG/ML
INJECTION, SOLUTION INTRAVENOUS CONTINUOUS
Status: DISCONTINUED | OUTPATIENT
Start: 2018-05-30 | End: 2018-06-01

## 2018-05-30 RX ORDER — LABETALOL HYDROCHLORIDE 5 MG/ML
10 INJECTION, SOLUTION INTRAVENOUS EVERY 4 HOURS PRN
Status: DISCONTINUED | OUTPATIENT
Start: 2018-05-30 | End: 2018-05-31

## 2018-05-30 RX ADMIN — MUPIROCIN 1 G: 20 OINTMENT TOPICAL at 09:05

## 2018-05-30 RX ADMIN — FENTANYL CITRATE 50 MCG: 50 INJECTION, SOLUTION INTRAMUSCULAR; INTRAVENOUS at 10:05

## 2018-05-30 RX ADMIN — PROPOFOL 150 MG: 10 INJECTION, EMULSION INTRAVENOUS at 10:05

## 2018-05-30 RX ADMIN — ROCURONIUM BROMIDE 10 MG: 10 INJECTION, SOLUTION INTRAVENOUS at 11:05

## 2018-05-30 RX ADMIN — ONDANSETRON 8 MG: 8 TABLET, ORALLY DISINTEGRATING ORAL at 07:05

## 2018-05-30 RX ADMIN — PROTAMINE SULFATE 15 MG: 10 INJECTION, SOLUTION INTRAVENOUS at 12:05

## 2018-05-30 RX ADMIN — HEPARIN SODIUM 7000 UNITS: 1000 INJECTION, SOLUTION INTRAVENOUS; SUBCUTANEOUS at 11:05

## 2018-05-30 RX ADMIN — SODIUM CHLORIDE, SODIUM GLUCONATE, SODIUM ACETATE, POTASSIUM CHLORIDE, MAGNESIUM CHLORIDE, SODIUM PHOSPHATE, DIBASIC, AND POTASSIUM PHOSPHATE: .53; .5; .37; .037; .03; .012; .00082 INJECTION, SOLUTION INTRAVENOUS at 11:05

## 2018-05-30 RX ADMIN — SODIUM CHLORIDE, SODIUM GLUCONATE, SODIUM ACETATE, POTASSIUM CHLORIDE, MAGNESIUM CHLORIDE, SODIUM PHOSPHATE, DIBASIC, AND POTASSIUM PHOSPHATE: .53; .5; .37; .037; .03; .012; .00082 INJECTION, SOLUTION INTRAVENOUS at 10:05

## 2018-05-30 RX ADMIN — FENTANYL CITRATE 25 MCG: 50 INJECTION, SOLUTION INTRAMUSCULAR; INTRAVENOUS at 09:05

## 2018-05-30 RX ADMIN — MIDAZOLAM HYDROCHLORIDE 0.5 MG: 1 INJECTION, SOLUTION INTRAMUSCULAR; INTRAVENOUS at 09:05

## 2018-05-30 RX ADMIN — LABETALOL HYDROCHLORIDE 10 MG: 5 INJECTION, SOLUTION INTRAVENOUS at 01:05

## 2018-05-30 RX ADMIN — HEPARIN SODIUM 10000 UNITS: 1000 INJECTION, SOLUTION INTRAVENOUS; SUBCUTANEOUS at 01:05

## 2018-05-30 RX ADMIN — PROTAMINE SULFATE 10 MG: 10 INJECTION, SOLUTION INTRAVENOUS at 12:05

## 2018-05-30 RX ADMIN — PHENYLEPHRINE HYDROCHLORIDE 100 MCG: 10 INJECTION INTRAVENOUS at 11:05

## 2018-05-30 RX ADMIN — HYDROCODONE BITARTRATE AND ACETAMINOPHEN 1 TABLET: 5; 325 TABLET ORAL at 05:05

## 2018-05-30 RX ADMIN — PHENYLEPHRINE HYDROCHLORIDE 200 MCG: 10 INJECTION INTRAVENOUS at 10:05

## 2018-05-30 RX ADMIN — PHENYLEPHRINE HYDROCHLORIDE 200 MCG: 10 INJECTION INTRAVENOUS at 11:05

## 2018-05-30 RX ADMIN — ONDANSETRON 8 MG: 8 TABLET, ORALLY DISINTEGRATING ORAL at 02:05

## 2018-05-30 RX ADMIN — PROTAMINE SULFATE 5 MG: 10 INJECTION, SOLUTION INTRAVENOUS at 12:05

## 2018-05-30 RX ADMIN — LABETALOL HYDROCHLORIDE 5 MG: 5 INJECTION, SOLUTION INTRAVENOUS at 12:05

## 2018-05-30 RX ADMIN — ACETAMINOPHEN 1000 MG: 10 INJECTION, SOLUTION INTRAVENOUS at 01:05

## 2018-05-30 RX ADMIN — CEFAZOLIN 2 G: 330 INJECTION, POWDER, FOR SOLUTION INTRAMUSCULAR; INTRAVENOUS at 11:05

## 2018-05-30 RX ADMIN — ROCURONIUM BROMIDE 10 MG: 10 INJECTION, SOLUTION INTRAVENOUS at 12:05

## 2018-05-30 RX ADMIN — CYCLOBENZAPRINE HYDROCHLORIDE 5 MG: 5 TABLET, FILM COATED ORAL at 05:05

## 2018-05-30 RX ADMIN — SUGAMMADEX 200 MG: 100 INJECTION, SOLUTION INTRAVENOUS at 12:05

## 2018-05-30 RX ADMIN — FENTANYL CITRATE 50 MCG: 50 INJECTION, SOLUTION INTRAMUSCULAR; INTRAVENOUS at 08:05

## 2018-05-30 RX ADMIN — SODIUM CHLORIDE 1000 ML: 0.9 INJECTION, SOLUTION INTRAVENOUS at 10:05

## 2018-05-30 RX ADMIN — FENTANYL CITRATE 25 MCG: 50 INJECTION, SOLUTION INTRAMUSCULAR; INTRAVENOUS at 08:05

## 2018-05-30 RX ADMIN — ACETAMINOPHEN 1000 MG: 10 INJECTION, SOLUTION INTRAVENOUS at 09:05

## 2018-05-30 RX ADMIN — LIDOCAINE HYDROCHLORIDE 100 MG: 20 INJECTION, SOLUTION INTRAVENOUS at 10:05

## 2018-05-30 RX ADMIN — CEFAZOLIN SODIUM 2 G: 1 INJECTION, POWDER, FOR SOLUTION INTRAMUSCULAR; INTRAVENOUS at 06:05

## 2018-05-30 RX ADMIN — MIDAZOLAM HYDROCHLORIDE 1 MG: 1 INJECTION, SOLUTION INTRAMUSCULAR; INTRAVENOUS at 08:05

## 2018-05-30 RX ADMIN — LORAZEPAM 0.5 MG: 0.5 TABLET ORAL at 02:05

## 2018-05-30 RX ADMIN — MUPIROCIN: 20 OINTMENT TOPICAL at 10:05

## 2018-05-30 RX ADMIN — Medication: at 01:05

## 2018-05-30 RX ADMIN — MIDAZOLAM HYDROCHLORIDE 0.5 MG: 1 INJECTION, SOLUTION INTRAMUSCULAR; INTRAVENOUS at 08:05

## 2018-05-30 RX ADMIN — TIOTROPIUM BROMIDE 18 MCG: 18 CAPSULE ORAL; RESPIRATORY (INHALATION) at 03:05

## 2018-05-30 RX ADMIN — IODIXANOL 63.6 ML: 320 INJECTION, SOLUTION INTRAVASCULAR at 01:05

## 2018-05-30 RX ADMIN — HYDRALAZINE HYDROCHLORIDE 10 MG: 20 INJECTION INTRAMUSCULAR; INTRAVENOUS at 01:05

## 2018-05-30 RX ADMIN — MIDAZOLAM HYDROCHLORIDE 2 MG: 1 INJECTION, SOLUTION INTRAMUSCULAR; INTRAVENOUS at 10:05

## 2018-05-30 RX ADMIN — ROCURONIUM BROMIDE 50 MG: 10 INJECTION, SOLUTION INTRAVENOUS at 10:05

## 2018-05-30 NOTE — ASSESSMENT & PLAN NOTE
Oli Chris is a 68 y.o. male with a large thoraco-abdominal aortic aneurysm (8.5cm in greatest dimension) who presented for pre-op hydration    NPO this morning for surgery  IR spinal drain to be placed  IVF  To OR today for endovascular repair  To SICU postop

## 2018-05-30 NOTE — PROGRESS NOTES
Patient arrived from PACU, s/p TEVAR. Pt to be flat on bed, HOB no greater than 10 degrees. Lumbar drain in place to drain 15 ml every hour. Pt AAOx3. Disoriented to situation. Team notified, spoke to Josephine.

## 2018-05-30 NOTE — PROCEDURES
Radiology Post-Procedure Note    Pre Op Diagnosis: lumbar drain pre surgery    Post Op Diagnosis: Same    Procedure: lumbar puncture    Procedure performed by: Justin Stiles MD    Written Informed Consent Obtained: Yes    Specimen Removed: 0 mL CSF    Estimated Blood Loss: Minimal    Findings: Following written informed consent and sterile prep and drape, a 22 gauge spinal needle was inserted at L3 - L4 intralaminar space under fluoroscopic surveillance.  Lumbar drain was place in lumbar cistern. There were no complications.    Patient tolerated procedure well.    Justin Stiles MD  Vascular and Interventional Neurology Staff  Director of [facility]  [facility]  [phone]

## 2018-05-30 NOTE — ASSESSMENT & PLAN NOTE
Admitted for preoperative hydration  Will watch kidney function postoperatively given need for contrast in procedure  Mario to placed intraop and will continue when in ICU

## 2018-05-30 NOTE — PLAN OF CARE
Problem: Patient Care Overview  Goal: Plan of Care Review  Outcome: Ongoing (interventions implemented as appropriate)  VS stable. No complaints overnight. Will continue to monitor. C/o back pain this morning. Norco given PO.

## 2018-05-30 NOTE — PROGRESS NOTES
Pt arrived from IR to [facility] 20 pre-op. Pt s/p lumbar drain, awaiting to go to surgery with Dr. Moss. Pt drowsy but arousable. VSS. Bed placed in low position. Pt denies pain at this time. Wife in waiting area and updated by IR nurse.

## 2018-05-30 NOTE — TRANSFER OF CARE
Anesthesia Transfer of Care Note    Patient: Oli Chris    Procedure(s) Performed: Procedure(s) (LRB):  REPAIR THORACIC-AORTIC ANEURYSM-ENDOVASCULAR GRAFT (TAA) (N/A)  ULTRASOUND-INTRAVASCULAR (IVUS) (N/A)  closure  Large bore L CFA percutaneous closure      Patient location: PACU    Anesthesia Type: general    Transport from OR: Transported from OR on room air with adequate spontaneous ventilation    Post pain: adequate analgesia    Post assessment: no apparent anesthetic complications and tolerated procedure well    Post vital signs: stable    Level of consciousness: awake, alert and oriented    Nausea/Vomiting: no nausea/vomiting    Complications: none    Transfer of care protocol was followed      Last vitals:   Visit Vitals  /74 (BP Location: Left arm, Patient Position: Lying)   Pulse (!) 59   Temp 36.5 °C (97.7 °F) (Temporal)   Resp 16   SpO2 95%

## 2018-05-30 NOTE — PROGRESS NOTES
Lumbar drain placement completed, pt tolerated well. No apparent distress noted. Dressing applied CDI. Pt to be transferred to [facility] 20, report to be given at bedside. Report called to primary nurse.

## 2018-05-30 NOTE — NURSING TRANSFER
Nursing Transfer Note      5/30/2018     Transfer To: [id number]    Transfer via bed    Transfer with cardiac monitoring    Transported by rnx2    Medicines sent: iv lfuids and pca    Chart send with patient: Yes    Notified: spouse    Patient reassessed at: 5/30/18 see flowsheets

## 2018-05-30 NOTE — PROGRESS NOTES
Patient arrived to [facility] from PACU    Diagnosis:   Abdominal aortic aneurisym/s/p TEVAR    Skin assessment done: Y    Wounds noted: bilateral groin incision    NCC notified: Josephine

## 2018-05-30 NOTE — NURSING
Pt in room with wife at the bedside. No acute distress or needs at this time. Transport staff received pt via stretcher to Endo/Surgery. No acute distress noted. VSS on RA. WCTM upon return.

## 2018-05-30 NOTE — SUBJECTIVE & OBJECTIVE
Medications:  Continuous Infusions:   sodium chloride 0.9% 125 mL/hr at 05/29/18 2059    sodium chloride 0.9%       Scheduled Meds:   lidocaine (PF) 10 mg/ml (1%)  1 mL Intradermal Once    sodium chloride 0.9%  3 mL Intravenous Q8H     PRN Meds:acetaminophen, HYDROcodone-acetaminophen, labetalol, mupirocin, ondansetron, pneumoc 13-tasha conj-dip cr(PF), promethazine (PHENERGAN) IVPB     Objective:     Vital Signs (Most Recent):  Temp: 97.7 °F (36.5 °C) (05/30/18 0930)  Pulse: 65 (05/30/18 1000)  Resp: 16 (05/30/18 1000)  BP: 137/89 (05/30/18 1000)  SpO2: 97 % (05/30/18 1000) Vital Signs (24h Range):  Temp:  [97.1 °F (36.2 °C)-97.9 °F (36.6 °C)] 97.7 °F (36.5 °C)  Pulse:  [64-93] 65  Resp:  [11-20] 16  SpO2:  [91 %-99 %] 97 %  BP: (126-166)/() 137/89       Date 05/30/18 0700 - 05/31/18 0659   Shift 8393-8167 5962-2624 [id number] 24 Hour Total   I  N  T  A  K  E   Shift Total       O  U  T  P  U  T   Urine 200   200    Shift Total 200   200   Weight (kg)           Physical Exam   Constitutional: He is oriented to person, place, and time. He appears well-developed and well-nourished. No distress.   HENT:   Head: Normocephalic and atraumatic.   Eyes: EOM are normal. No scleral icterus.   Neck: No tracheal deviation present.   Cardiovascular: Normal rate.    Pulmonary/Chest: Effort normal.   Abdominal: Soft.   Neurological: He is alert and oriented to person, place, and time. No cranial nerve deficit.       Significant Labs:  CBC:   Recent Labs  Lab 05/30/18  0511   WBC 9.41   RBC 4.25*   HGB 12.2*   HCT 37.9*      MCV 89   MCH 28.7   MCHC 32.2     CMP:   Recent Labs  Lab 05/30/18  0511      CALCIUM 8.7   ALBUMIN 2.7*   PROT 6.6      K 4.8   CO2 22*      BUN 30*   CREATININE 2.2*   ALKPHOS 164*   ALT 17   AST 28   BILITOT 0.6       Significant Diagnostics:  I have reviewed all pertinent imaging results/findings within the past 24 hours.

## 2018-05-30 NOTE — ANESTHESIA POSTPROCEDURE EVALUATION
Anesthesia Post Evaluation    Patient: Oli GALEANO Aries    Procedure(s) Performed: Procedure(s) (LRB):  REPAIR THORACIC-AORTIC ANEURYSM-ENDOVASCULAR GRAFT (TAA) (N/A)  ULTRASOUND-INTRAVASCULAR (IVUS) (N/A)  closure  Large bore L CFA percutaneous closure      Final Anesthesia Type: general  Patient location during evaluation: PACU  Patient participation: Yes- Able to Participate  Level of consciousness: awake and alert  Post-procedure vital signs: reviewed and stable  Pain management: pain needs to be addressed  Airway patency: patent  PONV status at discharge: No PONV  Anesthetic complications: no      Cardiovascular status: blood pressure returned to baseline  Respiratory status: unassisted  Hydration status: euvolemic  Follow-up not needed.        Visit Vitals  BP (!) 144/75   Pulse 80   Temp 36.5 °C (97.7 °F) (Temporal)   Resp 19   SpO2 95%       Pain/Aron Score: Pain Assessment Performed: Yes (5/30/2018  1:30 PM)  Presence of Pain: complains of pain/discomfort (5/30/2018  1:57 PM)  Pain Rating Prior to Med Admin: 4 (5/30/2018  1:57 PM)  Aron Score: 9 (5/30/2018  1:30 PM)

## 2018-05-30 NOTE — PLAN OF CARE
Met w pt/wife. Pt has No drug coverage but Dr Obrien says no anticoag needed  Pt has ride home when needed and pt says he's ready to get back to work.  I with ADLs  No future appointments.     05/30/18 0824   Discharge Assessment   Assessment Type Discharge Planning Assessment   Confirmed/corrected address and phone number on facesheet? Yes   Assessment information obtained from? Patient;Caregiver   Expected Length of Stay (days) 3   Communicated expected length of stay with patient/caregiver yes   Prior to hospitilization cognitive status: Alert/Oriented;No Deficits   Prior to hospitalization functional status: Independent   Current cognitive status: Alert/Oriented;No Deficits   Current Functional Status: Independent   Lives With spouse   Able to Return to Prior Arrangements yes   Is patient able to care for self after discharge? Yes   Who are your caregiver(s) and their phone number(s)? wife  [phone]   Patient's perception of discharge disposition home or selfcare   Readmission Within The Last 30 Days no previous admission in last 30 days   Patient currently being followed by outpatient case management? No   Patient currently receives any other outside agency services? No   Equipment Currently Used at Home none   Do you have any problems affording any of your prescribed medications? TBD   Is the patient taking medications as prescribed? yes   Does the patient have transportation home? Yes   Transportation Available family or friend will provide   Does the patient receive services at the [facility]mad[facility] Clinic? No   Discharge Plan A Home with family   Discharge Plan B Home with family

## 2018-05-30 NOTE — PROGRESS NOTES
[facility]  Vascular Surgery  Progress Note    Patient Name: Oli Chris  MRN: [id number]  Admission Date: 5/29/2018  Primary Care Provider: Anne Anaya MD    Subjective:     Interval History: No acute events overnight. Pt seen and examined at the bedside. Vital signs stable. Ready for OR.     Post-Op Info:  Procedure(s) (LRB):  REPAIR THORACIC-AORTIC ANEURYSM-ENDOVASCULAR GRAFT (TAA) (N/A)   Day of Surgery       Medications:  Continuous Infusions:   sodium chloride 0.9% 125 mL/hr at 05/29/18 2059    sodium chloride 0.9%       Scheduled Meds:   lidocaine (PF) 10 mg/ml (1%)  1 mL Intradermal Once    sodium chloride 0.9%  3 mL Intravenous Q8H     PRN Meds:acetaminophen, HYDROcodone-acetaminophen, labetalol, mupirocin, ondansetron, pneumoc 13-tasha conj-dip cr(PF), promethazine (PHENERGAN) IVPB     Objective:     Vital Signs (Most Recent):  Temp: 97.7 °F (36.5 °C) (05/30/18 0930)  Pulse: 65 (05/30/18 1000)  Resp: 16 (05/30/18 1000)  BP: 137/89 (05/30/18 1000)  SpO2: 97 % (05/30/18 1000) Vital Signs (24h Range):  Temp:  [97.1 °F (36.2 °C)-97.9 °F (36.6 °C)] 97.7 °F (36.5 °C)  Pulse:  [64-93] 65  Resp:  [11-20] 16  SpO2:  [91 %-99 %] 97 %  BP: (126-166)/() 137/89       Date 05/30/18 0700 - 05/31/18 0659   Shift 4270-5740 9808-1804 [id number] 24 Hour Total   I  N  T  A  K  E   Shift Total       O  U  T  P  U  T   Urine 200   200    Shift Total 200   200   Weight (kg)           Physical Exam   Constitutional: He is oriented to person, place, and time. He appears well-developed and well-nourished. No distress.   HENT:   Head: Normocephalic and atraumatic.   Eyes: EOM are normal. No scleral icterus.   Neck: No tracheal deviation present.   Cardiovascular: Normal rate.    Pulmonary/Chest: Effort normal.   Abdominal: Soft.   Neurological: He is alert and oriented to person, place, and time. No cranial nerve deficit.       Significant Labs:  CBC:   Recent Labs  Lab 05/30/18  0511   WBC  9.41   RBC 4.25*   HGB 12.2*   HCT 37.9*      MCV 89   MCH 28.7   MCHC 32.2     CMP:   Recent Labs  Lab 05/30/18  0511      CALCIUM 8.7   ALBUMIN 2.7*   PROT 6.6      K 4.8   CO2 22*      BUN 30*   CREATININE 2.2*   ALKPHOS 164*   ALT 17   AST 28   BILITOT 0.6       Significant Diagnostics:  I have reviewed all pertinent imaging results/findings within the past 24 hours.    Assessment/Plan:     CKD (chronic kidney disease) stage 4, GFR 15-29 ml/min    Admitted for preoperative hydration  Will watch kidney function postoperatively given need for contrast in procedure  Mario to placed intraop and will continue when in ICU        Thoracoabdominal aortic aneurysm, without rupture    Oli Chris is a 68 y.o. male with a large thoraco-abdominal aortic aneurysm (8.5cm in greatest dimension) who presented for pre-op hydration    NPO this morning for surgery  IR spinal drain to be placed  IVF  To OR today for endovascular repair  To SICU postop            Ranjeet Angel MD  Vascular Surgery  [facility]

## 2018-05-30 NOTE — ADDENDUM NOTE
Addendum  created 05/30/18 1600 by Nicole A. Lombardi, CRNA    Anesthesia Event deleted, Anesthesia Event edited

## 2018-05-30 NOTE — PROGRESS NOTES
Pt informed he will have to lay flat for 2 hours. Verbalized understanding. Report given to CARLA Cunha.

## 2018-05-30 NOTE — BRIEF OP NOTE
[facility]  Brief Operative Note    SUMMARY     Surgery Date: 5/30/2018     Surgeon(s) and Role:     * Noemy Obrien MD - Fellow     * LUIS ALBERTO Moss III, MD - Primary    Assisting Surgeon: None    Pre-op Diagnosis:  Thoracic aortic aneurysm without rupture [I71.2], 11 cm; CKD 4    Post-op Diagnosis:  Post-Op Diagnosis Codes:     * Thoracic aortic aneurysm without rupture [I71.2], 11 cm; CKD 4  PROCEDURES:    1. Percutaneous endovascular thoracic aortic aneurysm repair (TEVAR)  2.  IVUS interrogation of aorta and iliac arteries  3. Large bore L CFA percutaneous closure  4. Bilateral catheter in aorta    Anesthesia: General    Description of Procedure: Cook alpha 40 x 167 proximal, 46 x 179 distal ; no endoleak    Description of the findings of the procedure: 13.3 min fluoro;290 mGy; 63 cc visipaque    Estimated Blood Loss: 30cc         Specimens:   Specimen (12h ago through future)    None

## 2018-05-30 NOTE — PROGRESS NOTES
Pt arrived to IR room 190 for spinal drain placement, no acute distress noted. Orders, consent and labs reviewed on chart.

## 2018-05-30 NOTE — PLAN OF CARE
Problem: Patient Care Overview  Goal: Plan of Care Review  Outcome: Ongoing (interventions implemented as appropriate)  POC reviewed with pt at 1800 and verbalized understanding of the POC. Pt s/p Tevar. Flat on bed maintained, HOB not greater than 10 degrees. Lumbar drain in place to drain 15 ml ever hour. Questions and concerns addressed. Pt progressing towards goals of care. Please see flow sheet for detailed nursing assessment and VS. Will continue to monitor.

## 2018-05-30 NOTE — OP NOTE
[facility]  Vascular Surgery  Operative Note    SUMMARY     Date of Procedure: 5/30/2018     Procedure: 1. Percutaneous endovascular thoracic aortic aneurysm repair (TEVAR)    2.  IVUS interrogation of aorta and iliac arteries    3. Large bore L CFA percutaneous closure    4. Bilateral catheter in aorta    Surgeon(s) and Role:     * Noemy Obrien MD - Fellow     * LUIS ALBERTO Moss III, MD - Primary    Assisting Surgeon: None    Pre-Operative Diagnosis: Thoracic aortic aneurysm without rupture [I71.2]    Post-Operative Diagnosis: Post-Op Diagnosis Codes:     * Thoracic aortic aneurysm without rupture [I71.2]    Anesthesia: General    Indications for operation: 67 yo M with CKD stage IV with 11 cm thoracic aneurysm.    Description of the Findings of the Procedure: alpha 40 x 167 proximal, 46 x 179 distal ; no endoleak     Complications: No    Estimated Blood Loss (EBL): 25 mL         Implants:   Implant Name Type Inv. Item Serial No.  Lot No. LRB No. Used   thoracic endovascular graft   [id number]  [id number] N/A 1   zenith alpha thoracic endovascular graft     [id number]   [id number] N/A 1       Specimens:   Specimen (12h ago through future)    None                  Condition: Good    Disposition: PACU - hemodynamically stable.     Operation in detail: Informed consent was obtained and patient was brought to the interventional suite and placed in supine position.  General anesthesia was induced.  Radial arterial line and hu catheter were placed. Abdomen and bilateral groins were prepped and draped in sterile fashion.  Patient received perioperative IV antibiotics.  Appropriate time out was performed accurately identifying patient and procedure.    US guidance was utilized to confirm vessel patency of bilateral femoral arteries and these were cannulated with micropuncture under US guidance. Sheathogram was performed on the right CFA access sites to be in common femoral arteries.  J wire  was then advanced into the thoracic aorta from the right groin access with exchange for a 5 [place]araguan sheath and advancement of Omniflush catheter into thoracic aorta.  On the left, J wire introduced into the abdominal aorta followed by 6Fr sheath, access site increased with scalpel and dilated with hemostat, and two preclose sutures were placed at 10, 12 and 2 o'clock in the standard manner. This was followed by introduction of Lunderquist wire into aortic arch followed by a 10 Czech sheath.  Systemic heparinization was given to the patient with ACT titrated to maintain >250 throughout the case.  IVUS catheter was advanced into the thoracic aorta with difficulty visualizing take off of the SCL.  We were able to identify proximal and distal landing zones which correlated on fluoroscopy.    The left groin access site was then serially dilated with a 18 and 20 dilators.  The proximal Cook zenith alpha 40 x167 graft was advanced into the thoracic aorta. Angiogram was performed which demonstrated the location of the subclavian take off which long landing zone prior to thoracic aneurysm.   At this time, the Endurant device was deployed successfully.  We then performed an angiogram demonstrating the distal landing zone for the distal Cook Zenith Alpha 46x179 with care taken to land prior to dilated perivisceral segment.  The distal Zenith alpha was then deployed successfully.      Completion thoracic angiogram was performed demonstrating successful exclusion of TAA with no type Ia, Ib, II endoleak.  Angiogram also demonstrated patent celiac artery.    At this time, all catheters were removed, the three preclose sutures were tightened to achieve complete hemostasis on the left access site.  A 5 Nicaraguan Mynx device was deployed with good hemostasis on the right.    Patient tolerated procedure well. Bilateral groins were soft. Post procedure, patient continued to have biphasic DP and PT signals. HE* was extubated and taken to  the PACU in stable condition and was noted to be moving all extremities.

## 2018-05-31 PROBLEM — R52 PAIN: Status: ACTIVE | Noted: 2018-05-31

## 2018-05-31 PROBLEM — F41.9 ANXIETY: Status: ACTIVE | Noted: 2018-05-31

## 2018-05-31 LAB
ALBUMIN SERPL BCP-MCNC: 2.6 G/DL
ALBUMIN SERPL BCP-MCNC: 2.7 G/DL
ALP SERPL-CCNC: 146 U/L
ALP SERPL-CCNC: 147 U/L
ALT SERPL W/O P-5'-P-CCNC: 12 U/L
ALT SERPL W/O P-5'-P-CCNC: 12 U/L
ANION GAP SERPL CALC-SCNC: 10 MMOL/L
ANION GAP SERPL CALC-SCNC: 11 MMOL/L
APTT BLDCRRT: 27.1 SEC
AST SERPL-CCNC: 27 U/L
AST SERPL-CCNC: 28 U/L
BASOPHILS # BLD AUTO: 0.04 K/UL
BASOPHILS # BLD AUTO: 0.04 K/UL
BASOPHILS NFR BLD: 0.3 %
BASOPHILS NFR BLD: 0.3 %
BILIRUB SERPL-MCNC: 0.7 MG/DL
BILIRUB SERPL-MCNC: 0.7 MG/DL
BUN SERPL-MCNC: 29 MG/DL
BUN SERPL-MCNC: 30 MG/DL
CALCIUM SERPL-MCNC: 8.1 MG/DL
CALCIUM SERPL-MCNC: 8.3 MG/DL
CHLORIDE SERPL-SCNC: 107 MMOL/L
CHLORIDE SERPL-SCNC: 108 MMOL/L
CO2 SERPL-SCNC: 17 MMOL/L
CO2 SERPL-SCNC: 17 MMOL/L
CREAT SERPL-MCNC: 2.1 MG/DL
CREAT SERPL-MCNC: 2.2 MG/DL
DIFFERENTIAL METHOD: ABNORMAL
DIFFERENTIAL METHOD: ABNORMAL
EOSINOPHIL # BLD AUTO: 0 K/UL
EOSINOPHIL # BLD AUTO: 0 K/UL
EOSINOPHIL NFR BLD: 0.1 %
EOSINOPHIL NFR BLD: 0.2 %
ERYTHROCYTE [DISTWIDTH] IN BLOOD BY AUTOMATED COUNT: 13.2 %
ERYTHROCYTE [DISTWIDTH] IN BLOOD BY AUTOMATED COUNT: 13.6 %
EST. GFR  (AFRICAN AMERICAN): 34.8 ML/MIN/1.73 M^2
EST. GFR  (AFRICAN AMERICAN): 36.8 ML/MIN/1.73 M^2
EST. GFR  (NON AFRICAN AMERICAN): 30.1 ML/MIN/1.73 M^2
EST. GFR  (NON AFRICAN AMERICAN): 31.8 ML/MIN/1.73 M^2
GLUCOSE SERPL-MCNC: 93 MG/DL
GLUCOSE SERPL-MCNC: 95 MG/DL
HCT VFR BLD AUTO: 35.3 %
HCT VFR BLD AUTO: 36.3 %
HGB BLD-MCNC: 11.8 G/DL
HGB BLD-MCNC: 11.9 G/DL
IMM GRANULOCYTES # BLD AUTO: 0.04 K/UL
IMM GRANULOCYTES # BLD AUTO: 0.06 K/UL
IMM GRANULOCYTES NFR BLD AUTO: 0.3 %
IMM GRANULOCYTES NFR BLD AUTO: 0.4 %
INR PPP: 1.1
LYMPHOCYTES # BLD AUTO: 1.1 K/UL
LYMPHOCYTES # BLD AUTO: 1.3 K/UL
LYMPHOCYTES NFR BLD: 10.3 %
LYMPHOCYTES NFR BLD: 8 %
MAGNESIUM SERPL-MCNC: 1.5 MG/DL
MAGNESIUM SERPL-MCNC: 1.8 MG/DL
MCH RBC QN AUTO: 29 PG
MCH RBC QN AUTO: 29.5 PG
MCHC RBC AUTO-ENTMCNC: 32.8 G/DL
MCHC RBC AUTO-ENTMCNC: 33.4 G/DL
MCV RBC AUTO: 88 FL
MCV RBC AUTO: 88 FL
MONOCYTES # BLD AUTO: 0.9 K/UL
MONOCYTES # BLD AUTO: 0.9 K/UL
MONOCYTES NFR BLD: 6.6 %
MONOCYTES NFR BLD: 7 %
NEUTROPHILS # BLD AUTO: 10.7 K/UL
NEUTROPHILS # BLD AUTO: 11.7 K/UL
NEUTROPHILS NFR BLD: 81.9 %
NEUTROPHILS NFR BLD: 84.6 %
NRBC BLD-RTO: 0 /100 WBC
NRBC BLD-RTO: 0 /100 WBC
PHOSPHATE SERPL-MCNC: 4.1 MG/DL
PHOSPHATE SERPL-MCNC: 4.4 MG/DL
PLATELET # BLD AUTO: 176 K/UL
PLATELET # BLD AUTO: 181 K/UL
PMV BLD AUTO: 8.8 FL
PMV BLD AUTO: 9.1 FL
POTASSIUM SERPL-SCNC: 4.6 MMOL/L
POTASSIUM SERPL-SCNC: 4.7 MMOL/L
PROT SERPL-MCNC: 6.2 G/DL
PROT SERPL-MCNC: 6.4 G/DL
PROTHROMBIN TIME: 11.5 SEC
RBC # BLD AUTO: 4 M/UL
RBC # BLD AUTO: 4.11 M/UL
SODIUM SERPL-SCNC: 134 MMOL/L
SODIUM SERPL-SCNC: 136 MMOL/L
WBC # BLD AUTO: 13.05 K/UL
WBC # BLD AUTO: 13.82 K/UL

## 2018-05-31 PROCEDURE — 83735 ASSAY OF MAGNESIUM: CPT

## 2018-05-31 PROCEDURE — 25000003 PHARM REV CODE 250: Performed by: STUDENT IN AN ORGANIZED HEALTH CARE EDUCATION/TRAINING PROGRAM

## 2018-05-31 PROCEDURE — 36620 INSERTION CATHETER ARTERY: CPT | Mod: 59,,, | Performed by: ANESTHESIOLOGY

## 2018-05-31 PROCEDURE — 63600175 PHARM REV CODE 636 W HCPCS: Performed by: STUDENT IN AN ORGANIZED HEALTH CARE EDUCATION/TRAINING PROGRAM

## 2018-05-31 PROCEDURE — 25000003 PHARM REV CODE 250: Performed by: PHYSICIAN ASSISTANT

## 2018-05-31 PROCEDURE — 63600175 PHARM REV CODE 636 W HCPCS: Performed by: SURGERY

## 2018-05-31 PROCEDURE — 85730 THROMBOPLASTIN TIME PARTIAL: CPT

## 2018-05-31 PROCEDURE — 84100 ASSAY OF PHOSPHORUS: CPT | Mod: 91

## 2018-05-31 PROCEDURE — 20000000 HC ICU ROOM

## 2018-05-31 PROCEDURE — 85025 COMPLETE CBC W/AUTO DIFF WBC: CPT

## 2018-05-31 PROCEDURE — 25000003 PHARM REV CODE 250: Performed by: SURGERY

## 2018-05-31 PROCEDURE — 25000003 PHARM REV CODE 250: Performed by: PSYCHIATRY & NEUROLOGY

## 2018-05-31 PROCEDURE — S4991 NICOTINE PATCH NONLEGEND: HCPCS | Performed by: PSYCHIATRY & NEUROLOGY

## 2018-05-31 PROCEDURE — 94761 N-INVAS EAR/PLS OXIMETRY MLT: CPT

## 2018-05-31 PROCEDURE — 85610 PROTHROMBIN TIME: CPT

## 2018-05-31 PROCEDURE — 80053 COMPREHEN METABOLIC PANEL: CPT

## 2018-05-31 PROCEDURE — 25000242 PHARM REV CODE 250 ALT 637 W/ HCPCS: Performed by: SURGERY

## 2018-05-31 PROCEDURE — 99233 SBSQ HOSP IP/OBS HIGH 50: CPT | Mod: ,,, | Performed by: PHYSICIAN ASSISTANT

## 2018-05-31 RX ORDER — ONDANSETRON HYDROCHLORIDE 4 MG/5ML
4 SOLUTION ORAL ONCE
Status: DISCONTINUED | OUTPATIENT
Start: 2018-05-31 | End: 2018-05-31

## 2018-05-31 RX ORDER — IBUPROFEN 200 MG
1 TABLET ORAL DAILY
Status: DISCONTINUED | OUTPATIENT
Start: 2018-05-31 | End: 2018-06-02 | Stop reason: HOSPADM

## 2018-05-31 RX ORDER — BUPROPION HYDROCHLORIDE 75 MG/1
75 TABLET ORAL 2 TIMES DAILY
Status: DISCONTINUED | OUTPATIENT
Start: 2018-05-31 | End: 2018-06-02 | Stop reason: HOSPADM

## 2018-05-31 RX ORDER — LABETALOL HYDROCHLORIDE 5 MG/ML
10 INJECTION, SOLUTION INTRAVENOUS EVERY 6 HOURS PRN
Status: DISCONTINUED | OUTPATIENT
Start: 2018-05-31 | End: 2018-06-02 | Stop reason: HOSPADM

## 2018-05-31 RX ORDER — AMOXICILLIN 250 MG
1 CAPSULE ORAL DAILY
Status: DISCONTINUED | OUTPATIENT
Start: 2018-05-31 | End: 2018-06-02 | Stop reason: HOSPADM

## 2018-05-31 RX ORDER — LABETALOL 100 MG/1
100 TABLET, FILM COATED ORAL EVERY 12 HOURS PRN
Status: DISCONTINUED | OUTPATIENT
Start: 2018-05-31 | End: 2018-05-31

## 2018-05-31 RX ORDER — LORAZEPAM 0.5 MG/1
0.5 TABLET ORAL ONCE
Status: COMPLETED | OUTPATIENT
Start: 2018-05-31 | End: 2018-05-31

## 2018-05-31 RX ADMIN — ONDANSETRON 8 MG: 8 TABLET, ORALLY DISINTEGRATING ORAL at 08:05

## 2018-05-31 RX ADMIN — LABETALOL HYDROCHLORIDE 10 MG: 5 INJECTION, SOLUTION INTRAVENOUS at 05:05

## 2018-05-31 RX ADMIN — CEFAZOLIN SODIUM 2 G: 1 INJECTION, POWDER, FOR SOLUTION INTRAMUSCULAR; INTRAVENOUS at 12:05

## 2018-05-31 RX ADMIN — ACETAMINOPHEN 1000 MG: 10 INJECTION, SOLUTION INTRAVENOUS at 02:05

## 2018-05-31 RX ADMIN — MUPIROCIN 1 G: 20 OINTMENT TOPICAL at 09:05

## 2018-05-31 RX ADMIN — ACETAMINOPHEN 1000 MG: 10 INJECTION, SOLUTION INTRAVENOUS at 09:05

## 2018-05-31 RX ADMIN — CEFAZOLIN SODIUM 2 G: 1 INJECTION, POWDER, FOR SOLUTION INTRAMUSCULAR; INTRAVENOUS at 02:05

## 2018-05-31 RX ADMIN — ACETAMINOPHEN 1000 MG: 10 INJECTION, SOLUTION INTRAVENOUS at 06:05

## 2018-05-31 RX ADMIN — NICOTINE 1 PATCH: 21 PATCH, EXTENDED RELEASE TRANSDERMAL at 04:05

## 2018-05-31 RX ADMIN — LORAZEPAM 0.5 MG: 0.5 TABLET ORAL at 01:05

## 2018-05-31 RX ADMIN — BUPROPION HYDROCHLORIDE 75 MG: 75 TABLET, FILM COATED ORAL at 05:05

## 2018-05-31 RX ADMIN — TIOTROPIUM BROMIDE 18 MCG: 18 CAPSULE ORAL; RESPIRATORY (INHALATION) at 09:05

## 2018-05-31 RX ADMIN — PROMETHAZINE HYDROCHLORIDE 6.25 MG: 25 INJECTION INTRAMUSCULAR; INTRAVENOUS at 12:05

## 2018-05-31 RX ADMIN — LABETALOL HYDROCHLORIDE 10 MG: 5 INJECTION, SOLUTION INTRAVENOUS at 09:05

## 2018-05-31 RX ADMIN — STANDARDIZED SENNA CONCENTRATE AND DOCUSATE SODIUM 1 TABLET: 8.6; 5 TABLET, FILM COATED ORAL at 12:05

## 2018-05-31 NOTE — NURSING
Yanet with Vascular contacted, stated to not remove pt hu until lumbar drain is d/c'd which will possibly be later on today. Will continue to monitor and notify as needed.

## 2018-05-31 NOTE — ADDENDUM NOTE
Addendum  created 05/31/18 0633 by Nicole A. Lombardi, CRNA    Anesthesia Event deleted, Anesthesia Event edited

## 2018-05-31 NOTE — HOSPITAL COURSE
5/30: POD0 s/p TEVAR and lumbar drain for 8.5 cm thoracic and aortic aneurysm. Admit to [facility]  5/31: Vascular Surgery to clamp drain in evening.   6/1: LD removed in AM. Tolerated well. TTF

## 2018-05-31 NOTE — PLAN OF CARE
Problem: Patient Care Overview  Goal: Plan of Care Review  Outcome: Ongoing (interventions implemented as appropriate)  VS and assessment per flow sheet, patient progressing towards goal as tolerated. Neuro status unchanged per baseline, pt states no numbness or tinglings present. Lumbar drain in place leveled to iliac crest. HOB flat at all times. NS gtt at 75. Pt had vomiting episodes x2, antiemetics administered, Vascular notified and stated to keep pt NPO as tolerated except meds. Pt also had episodes of shakiness, anxiety, and irritability, nicotine patch and Wellbutrin ordered. Vascular plans to clamp lumber drain today. Plan of care reviewed with Oli Chris and spouse, all concerns addressed. Will continue to monitor.

## 2018-05-31 NOTE — NURSING
Pt anxious, c/o dizziness, shivering but not feeling too cold, and discomfort. Whitney SHERWOOD with NCC Notified. Stated will be by to asess. Will continue to monitor and notify as needed.

## 2018-05-31 NOTE — ASSESSMENT & PLAN NOTE
- Pt arrived to NICU with PCA pump  - May consider deescalating pump tomorrow to transition to oral pain meds

## 2018-05-31 NOTE — NURSING
"Pt vomited 300 cc of brown emesis, after postioning pt in reverse trendelenburg position in order to drink coffee.  Pt stated " I can't eat or drink lying flat." Pt has order to keep HOB flat or no greater than 10 degrees when lumbar drain in place. Prn disintegrating zofran administered, phenergan IV on hold in case N/V doesn't improve. Camille HAY with Vascular at bedside and notified, also gave verbal order to switch PO labetalol order to prn labetalol  IV 10mg prn q6h to keep SBP > 170. Will continue to monitor and notify as needed.  "

## 2018-05-31 NOTE — PLAN OF CARE
Problem: Patient Care Overview  Goal: Plan of Care Review  Outcome: Ongoing (interventions implemented as appropriate)  POC reviewed with pt and wife at 0400. Pt verbalized understanding. Questions and concerns addressed. No acute events overnight. Pt progressing toward goals. Pt had two episodes of N/V overnight. POC- transition from PCA pump to oral pain meds. Will continue to monitor. See flowsheets for full assessment and VS info

## 2018-05-31 NOTE — NURSING
Camille HAY with Vascular notified of pt anxiety, shivering, jitteriness, and discomfort. Gave verbal order to order nicotine patch. Will continue to monitor and notify as needed.

## 2018-05-31 NOTE — ASSESSMENT & PLAN NOTE
s/p TEVAR and lumbar drain for 8.5 cm thoracic and aortic aneurysm  -- Thoracic Aortic Aneurysm 8.5 cm  -- Vascular Surgery following  -- Lumbar Drain in place  -- MAP goal >80   -- Cefazolin for drain prophylaxis  -- PCA pump in for pain  -- Pending further recommendations per Vascular Surgery

## 2018-05-31 NOTE — ADDENDUM NOTE
Addendum  created 05/31/18 0745 by Junior Mendes MD    Anesthesia Intra Blocks edited, Child order released for a procedure order, LDA created via procedure documentation, Sign clinical note

## 2018-05-31 NOTE — ASSESSMENT & PLAN NOTE
-- Creatinine at baseline 2.4  -- Will continue to monitor   -- May consider to consult Nephrology

## 2018-05-31 NOTE — ANESTHESIA PROCEDURE NOTES
Arterial    Diagnosis: thoracic aortic aneurysm    Patient location during procedure: done in OR  Procedure start time: 5/30/2018 10:56 AM  Timeout: 5/30/2018 10:55 AM  Procedure end time: 5/30/2018 10:58 AM  Staffing  Anesthesiologist: KISHOR LEVY  Performed: anesthesiologist   Anesthesiologist was present at the time of the procedure.  Preanesthetic Checklist  Completed: patient identified, site marked, surgical consent, pre-op evaluation, timeout performed, IV checked, risks and benefits discussed, monitors and equipment checked and anesthesia consent givenArterial  Skin Prep: chlorhexidine gluconate  Local Infiltration: none  Orientation: right  Location: radial  Catheter Size: 20 G  Catheter placement by Anatomical landmarks. Heme positive aspiration all ports.Insertion Attempts: 1  Assessment  Dressing: secured with tape and tegaderm  Patient: Tolerated well

## 2018-05-31 NOTE — PLAN OF CARE
Patient in the Neuro ICU, S/p   Procedure:       1. Percutaneous endovascular thoracic aortic aneurysm repair (TEVAR)  2.  IVUS interrogation of aorta and iliac arteries  3. Large bore L CFA percutaneous closure  4. Bilateral catheter in aorta.    Lumbar drain in place.  Not medically stable for discharge.     CM will follow patient for discharge needs pending PT/OT eval when appropriate.    Juanis Ordoñez RN, CCRN-K, [facility]  Neuro-Critical Care   [phone]

## 2018-05-31 NOTE — ASSESSMENT & PLAN NOTE
-- MAP >80, SBP <170  -- Holding home lisinopril at this time while MAP goal >80  -- PRN labetalol

## 2018-05-31 NOTE — SUBJECTIVE & OBJECTIVE
Medications:  Continuous Infusions:   sodium chloride 0.9% 100 mL/hr at 05/31/18 0800    morphine       Scheduled Meds:   acetaminophen  1,000 mg Intravenous Q8H    ceFAZolin (ANCEF) IVPB  2 g Intravenous Q8H    mupirocin  1 g Nasal BID    tiotropium  18 mcg Inhalation Daily     PRN Meds:cyclobenzaprine, labetalol, naloxone, ondansetron, promethazine (PHENERGAN) IVPB, senna-docusate 8.6-50 mg     Objective:     Vital Signs (Most Recent):  Temp: 98.2 °F (36.8 °C) (05/31/18 0701)  Pulse: 104 (05/31/18 0800)  Resp: 14 (05/31/18 0800)  BP: (!) 167/100 (05/31/18 0800)  SpO2: (!) 94 % (05/31/18 0800) Vital Signs (24h Range):  Temp:  [97.7 °F (36.5 °C)-98.6 °F (37 °C)] 98.2 °F (36.8 °C)  Pulse:  [] 104  Resp:  [11-31] 14  SpO2:  [92 %-97 %] 94 %  BP: (120-173)/() 167/100  Arterial Line BP: (126-180)/(64-89) 177/86       Date 05/31/18 0700 - 06/01/18 0659   Shift 5098-4665 2352-5818 4729-0936 24 Hour Total   I  N  T  A  K  E   Shift Total  (mL/kg)       O  U  T  P  U  T   Urine  (mL/kg/hr) 120   120    Emesis/NG output 300   300    Drains 22   22    Shift Total  (mL/kg) 442  (7.4)   442  (7.4)   Weight (kg) 59.7 59.7 59.7 59.7       Physical Exam   Constitutional: He is oriented to person, place, and time. He appears well-developed and well-nourished. No distress.   Agitated at having to lay flat   HENT:   Head: Normocephalic and atraumatic.   Eyes: EOM are normal. No scleral icterus.   Pulmonary/Chest: Effort normal. No respiratory distress.   Abdominal: Soft. He exhibits no distension. There is no tenderness.   Genitourinary:   Genitourinary Comments: Groins soft, no hematomas   Neurological: He is alert and oriented to person, place, and time. No cranial nerve deficit.   Moving all extremities, no neuro deficits   Psychiatric: He has a normal mood and affect. His behavior is normal.       Significant Labs:  ABGs: No results for input(s): PH, PCO2, PO2, HCO3, POCSATURATED, BE in the last 48  hours.  CBC:   Recent Labs  Lab 05/31/18  0607   WBC 13.82*   RBC 4.11*   HGB 11.9*   HCT 36.3*      MCV 88   MCH 29.0   MCHC 32.8     CMP:   Recent Labs  Lab 05/31/18  0607   GLU 93   CALCIUM 8.3*   ALBUMIN 2.6*   PROT 6.2      K 4.6   CO2 17*      BUN 29*   CREATININE 2.1*   ALKPHOS 147*   ALT 12   AST 27   BILITOT 0.7       Significant Diagnostics:  I have reviewed all pertinent imaging results/findings within the past 24 hours.

## 2018-05-31 NOTE — H&P
[facility]  Neurocritical Care  History & Physical    Admit Date: 5/29/2018  Service Date: 05/30/2018  Length of Stay: 1    Subjective:     Chief Complaint: Thoracic aortic aneurysm    History of Present Illness: Mr. Chris is a 68 year old male with a PMHx of CKD Stage IV, HTN, chronic back pain, tobacco abuse who presents to Neuro Critical Care s/p TEVAR and lumbar drain for 8.5 cm thoracic and aortic aneurysm. Per the chart, patient visited PCP with throbbing lower back pain. He was evaluated with plain film and incidentally found to have thoracic aortic aneurysm. Patient is followed by Vascular Surgery. Patient will be admitted to Neuro Critical Care for a higher level of care.     History reviewed. No pertinent past medical history.  History reviewed. No pertinent surgical history.   No current facility-administered medications on file prior to encounter.      Current Outpatient Prescriptions on File Prior to Encounter   Medication Sig Dispense Refill    HYDROcodone-acetaminophen (NORCO) 7.5-325 mg per tablet Take 1 tablet by mouth every 6 (six) hours as needed for Pain.      lisinopril 10 MG tablet Take 10 mg by mouth.      tiotropium (SPIRIVA) 18 mcg inhalation capsule Inhale 18 mcg into the lungs once daily. Controller        Allergies: Patient has no known allergies.    History reviewed. No pertinent family history.  Social History   Substance Use Topics    Smoking status: Current Every Day Smoker     Types: Cigarettes    Smokeless tobacco: Not on file    Alcohol use No     Review of Systems   Review of symptoms Denies all   Constitutional: Denies fevers or chills.  Pulmonary: Denies shortness of breath or cough.  Cardiology: Denies chest pain or palpitations.  GI: Denies abdominal pain or constipation.  Neurologic: Denies new weakness,  headache, or paresthesias.  Objective:     Vitals:    Temp: 97.7 °F (36.5 °C)  Pulse: 96  Rhythm: normal sinus rhythm  BP: (!) 145/81  MAP  (mmHg): 108  ICP Mean (mmHg): 6 mmHg  Resp: 13  SpO2: 95 %  O2 Device (Oxygen Therapy): room air    Temp  Min: 97.1 °F (36.2 °C)  Max: 98.6 °F (37 °C)  Pulse  Min: 59  Max: 96  BP  Min: 120/74  Max: 173/90  MAP (mmHg)  Min: 90  Max: 126  ICP Mean (mmHg)  Min: 4 mmHg  Max: 20 mmHg  Resp  Min: 11  Max: 27  ETCO2 (mmHg)  Min: 27 mmHg  Max: 39 mmHg  SpO2  Min: 91 %  Max: 99 %    05/29 0701 - 05/30 0700  In: 2983.8 [P.O.:840; I.V.:2143.8]  Out: 1450 [Urine:1450]           Physical Exam     Physical Exam:  GA:Awake, Alert, Oriented, Follows commands, Moves all extremities comfortable, no acute distress.   HEENT: No scleral icterus or JVD.   Pulmonary: Clear to auscultation Anterior.  No wheezing, crackles, or rhonchi.  Cardiac: RRR S1 & S2 w/o rubs/murmurs/gallops.   Abdominal: Bowel sounds present x 4. No appreciable hepatosplenomegaly.  Skin: + lumbar drain    Neuro:  --GCS: E4 V5 M6  --Mental Status:  Awake, alert, Oriented, follows commands  --CN II-XII grossly intact.   --Pupils 4mm, PERRL.   --Corneal reflex, gag, cough intact.  -- Moves all extremities spontaneously     Unable to test gait due to level of consciousness.    Today I personally reviewed pertinent medications, lines/drains/airways, lab results, notably:        Assessment/Plan:     Cardiac/Vascular   * Thoracic aortic aneurysm    s/p TEVAR and lumbar drain for 8.5 cm thoracic and aortic aneurysm  -- Thoracic Aortic Aneurysm 8.5 cm  -- Vascular Surgery following  -- Lumbar Drain in place  -- MAP goal >80   -- Cefazolin for drain prophylaxis  -- PCA pump in for pain  -- Pending further recommendations per Vascular Surgery        Renal/   CKD (chronic kidney disease) stage 4, GFR 15-29 ml/min    -- Creatinine at baseline 2.4  -- Will continue to monitor   -- May consider to consult Nephrology        Other   Pain    - Pt arrived to NICU with PCA pump  - May consider deescalating pump tomorrow to transition to oral pain meds        Cigarette nicotine  dependence without complication    -- Consider smoking cessation counseling             Prophylaxis:  Venous Thromboembolism: mechanical  Stress Ulcer: None  Ventilator Pneumonia: not applicable     Activity Orders          Bed rest starting at 05/30 1313    Bed rest starting at 05/30 1307        Full Code    LEVEL III Initial   I Spent >58 min reviewing patient records, examining, and counseling the patient with greater than 50% of time spent with direct patient care and coordination.     Josephine Edwards PA-C  Neurocritical Care  [facility]

## 2018-05-31 NOTE — ASSESSMENT & PLAN NOTE
POD1 s/p TEVAR and lumbar drain for 8.5 cm thoracic and aortic aneurysm  -- Vascular Surgery following  -- Lumbar Drain in place, Vascular Surgery planning on clamping this evening  -- MAP goal >80, SBP goal <170  -- Cefazolin for drain prophylaxis  -- PCA pump in for pain

## 2018-05-31 NOTE — ASSESSMENT & PLAN NOTE
Oli Chris is a 68 y.o. male with a large thoraco-abdominal aortic aneurysm (8.5cm in greatest dimension) who presented for pre-op hydration    Regular diet, as tolerated given patient needs to lay flat  Will clamp drain today and see if pt passes clamp trials, if so will have IR discontinue drain  Hold heparin products in anticipation of drain removal  Daily labs  q1 neurovascular checks  DVT ppx once drain out  GI ppx if patient not eating  ASA, Plavix

## 2018-05-31 NOTE — PROGRESS NOTES
[facility]  Vascular Surgery  Progress Note    Patient Name: Oli Chris  MRN: [id number]  Admission Date: 5/29/2018  Primary Care Provider: Anne Anaya MD    Subjective:     Interval History: Patient HDS overnight. Complaining about having to lay flat. No leg pain. Moving all extremities.     Post-Op Info:  Procedure(s) (LRB):  REPAIR THORACIC-AORTIC ANEURYSM-ENDOVASCULAR GRAFT (TAA) (N/A)  ULTRASOUND-INTRAVASCULAR (IVUS) (N/A)  closure  Large bore L CFA percutaneous closure     1 Day Post-Op       Medications:  Continuous Infusions:   sodium chloride 0.9% 100 mL/hr at 05/31/18 0800    morphine       Scheduled Meds:   acetaminophen  1,000 mg Intravenous Q8H    ceFAZolin (ANCEF) IVPB  2 g Intravenous Q8H    mupirocin  1 g Nasal BID    tiotropium  18 mcg Inhalation Daily     PRN Meds:cyclobenzaprine, labetalol, naloxone, ondansetron, promethazine (PHENERGAN) IVPB, senna-docusate 8.6-50 mg     Objective:     Vital Signs (Most Recent):  Temp: 98.2 °F (36.8 °C) (05/31/18 0701)  Pulse: 104 (05/31/18 0800)  Resp: 14 (05/31/18 0800)  BP: (!) 167/100 (05/31/18 0800)  SpO2: (!) 94 % (05/31/18 0800) Vital Signs (24h Range):  Temp:  [97.7 °F (36.5 °C)-98.6 °F (37 °C)] 98.2 °F (36.8 °C)  Pulse:  [] 104  Resp:  [11-31] 14  SpO2:  [92 %-97 %] 94 %  BP: (120-173)/() 167/100  Arterial Line BP: (126-180)/(64-89) 177/86       Date 05/31/18 0700 - 06/01/18 0659   Shift [id number] 3389-8908 6645-6535 24 Hour Total   I  N  T  A  K  E   Shift Total  (mL/kg)       O  U  T  P  U  T   Urine  (mL/kg/hr) 120   120    Emesis/NG output 300   300    Drains 22   22    Shift Total  (mL/kg) 442  (7.4)   442  (7.4)   Weight (kg) 59.7 59.7 59.7 59.7       Physical Exam   Constitutional: He is oriented to person, place, and time. He appears well-developed and well-nourished. No distress.   Agitated at having to lay flat   HENT:   Head: Normocephalic and atraumatic.   Eyes: EOM are normal. No scleral  icterus.   Pulmonary/Chest: Effort normal. No respiratory distress.   Abdominal: Soft. He exhibits no distension. There is no tenderness.   Genitourinary:   Genitourinary Comments: Groins soft, no hematomas   Neurological: He is alert and oriented to person, place, and time. No cranial nerve deficit.   Moving all extremities, no neuro deficits   Psychiatric: He has a normal mood and affect. His behavior is normal.       Significant Labs:  ABGs: No results for input(s): PH, PCO2, PO2, HCO3, POCSATURATED, BE in the last 48 hours.  CBC:   Recent Labs  Lab 05/31/18  0607   WBC 13.82*   RBC 4.11*   HGB 11.9*   HCT 36.3*      MCV 88   MCH 29.0   MCHC 32.8     CMP:   Recent Labs  Lab 05/31/18  0607   GLU 93   CALCIUM 8.3*   ALBUMIN 2.6*   PROT 6.2      K 4.6   CO2 17*      BUN 29*   CREATININE 2.1*   ALKPHOS 147*   ALT 12   AST 27   BILITOT 0.7       Significant Diagnostics:  I have reviewed all pertinent imaging results/findings within the past 24 hours.    Assessment/Plan:     CKD (chronic kidney disease) stage 4, GFR 15-29 ml/min    Admitted for preoperative hydration  Will continue to monitor kidney function postoperatively given need for contrast in procedure  Mario to placed intraop and will continue when in ICU and while imobile        Thoracoabdominal aortic aneurysm, without rupture    Oli Chris is a 68 y.o. male with a large thoraco-abdominal aortic aneurysm (8.5cm in greatest dimension) who presented for pre-op hydration    Regular diet, as tolerated given patient needs to lay flat  Will clamp drain today and see if pt passes clamp trials, if so will have IR discontinue drain  Hold heparin products in anticipation of drain removal  Daily labs  q1 neurovascular checks  DVT ppx once drain out  GI ppx if patient not eating  ASA, Plavix            Ranjeet Angel MD  Vascular Surgery  [facility]

## 2018-05-31 NOTE — PROGRESS NOTES
[facility]  Neurocritical Care  Progress Note    Admit Date: 5/29/2018  Service Date: 05/31/2018  Length of Stay: 2    Subjective:     Chief Complaint: Thoracic aortic aneurysm    History of Present Illness: Mr. Chris is a 68 year old male with a PMHx of CKD Stage IV, HTN, chronic back pain, tobacco abuse who presents to Neuro Critical Care s/p TEVAR and lumbar drain for 8.5 cm thoracic and aortic aneurysm. Per the chart, patient visited PCP with throbbing lower back pain. He was evaluated with plain film and incidentally found to have thoracic aortic aneurysm. Patient is followed by Vascular Surgery. Patient will be admitted to Neuro Critical Care for a higher level of care.     Hospital Course: 5/30: POD0 s/p TEVAR and lumbar drain for 8.5 cm thoracic and aortic aneurysm. Admit to [facility]  5/31: Vascular Surgery to clamp drain in evening.       Interval History: Patient neurologically stable overnight. Vascular Surgery to clamp drain this evening. Begin wellbutrin for anxiety and nicotine patch for nicotine withdrawal.     Review of Systems: Denies shortness of breath, abdominal pain, back pain, chest pain, limb weakness, HA. Reports nausea.     Vitals:   Temp: 97.6 °F (36.4 °C)  Pulse: 90  Rhythm: normal sinus rhythm  BP: (!) 168/84  MAP (mmHg): 119  ICP Mean (mmHg): 5 mmHg  Resp: 20  SpO2: (!) 94 %  O2 Device (Oxygen Therapy): room air    Temp  Min: 97.6 °F (36.4 °C)  Max: 98.2 °F (36.8 °C)  Pulse  Min: 73  Max: 108  BP  Min: 137/84  Max: 173/90  MAP (mmHg)  Min: 106  Max: 125  ICP Mean (mmHg)  Min: 3 mmHg  Max: 11 mmHg  Resp  Min: 12  Max: 31  SpO2  Min: 92 %  Max: 96 %    05/30 0701 - 05/31 0700  In: 3950 [I.V.:3650]  Out: 2663 [Urine:2175; Drains:188]   Unmeasured Output  Stool Occurrence: 0  Emesis Occurrence: 1     Examination:   Constitutional: Well-nourished and -developed. No apparent distress.   Eyes: Conjunctiva clear, anicteric. Lids no  lesions.  Head/Ears/Nose/Mouth/Throat/Neck: Moist mucous membranes. External ears, nose atraumatic.   Cardiovascular: Regular rhythm. No murmurs. No leg edema.  Respiratory: Comfortable respirations. Clear to auscultation.  Gastrointestinal: No hernia. Soft, nondistended, nontender. + bowel sounds.    Neurologic:  -GCS E4V5M6  -Alert. Oriented to person, place, and time. Speech fluent. Follows commands.  -Cranial nerves intact  -Motor 5/5 in all four extremities   -Sensation intact     Medications:   Continuous  sodium chloride 0.9% Last Rate: 75 mL/hr at 05/31/18 1501   morphine    Scheduled  acetaminophen 1,000 mg Q8H   buPROPion 75 mg BID   mupirocin 1 g BID   nicotine 1 patch Daily   senna-docusate 8.6-50 mg 1 tablet Daily   tiotropium 18 mcg Daily   PRN  cyclobenzaprine 5 mg TID PRN   labetalol 10 mg Q6H PRN   naloxone 0.02 mg PRN   ondansetron 8 mg Q8H PRN   promethazine (PHENERGAN) IVPB 6.25 mg Q6H PRN   senna-docusate 8.6-50 mg 1 tablet Daily PRN      Today I independently reviewed pertinent medications, lines/drains/airways, lab results,     Assessment/Plan:     Psychiatric   Anxiety    -- Begin bupropion 75 mg BID         Cardiac/Vascular   * Thoracic aortic aneurysm    POD1 s/p TEVAR and lumbar drain for 8.5 cm thoracic and aortic aneurysm  -- Vascular Surgery following  -- Lumbar Drain in place, Vascular Surgery planning on clamping this evening  -- MAP goal >80, SBP goal <170  -- Cefazolin for drain prophylaxis  -- PCA pump in for pain        Essential hypertension    -- MAP >80, SBP <170  -- Holding home lisinopril at this time while MAP goal >80  -- PRN labetalol         Renal/   CKD (chronic kidney disease) stage 4, GFR 15-29 ml/min    -- Creatinine at baseline  -- Daily CMP  -- NS @ 75 cc/hr until PO intake improved         Other   Cigarette nicotine dependence without complication    - Consider smoking cessation counseling   - Nicotine 21 mg/24hr patch in place            Prophylaxis:  Venous  Thromboembolism: mechanical chemical  Stress Ulcer: NA  Ventilator Pneumonia: not applicable     Activity Orders          Bed rest starting at 05/30 1313    Bed rest starting at 05/30 1307        Full Code    Anabelle Rubio PA-C  Neurocritical Care  [facility]

## 2018-06-01 LAB
ALBUMIN SERPL BCP-MCNC: 2.6 G/DL
ALP SERPL-CCNC: 139 U/L
ALT SERPL W/O P-5'-P-CCNC: 5 U/L
ANION GAP SERPL CALC-SCNC: 8 MMOL/L
APTT BLDCRRT: 27.1 SEC
AST SERPL-CCNC: 24 U/L
BASOPHILS # BLD AUTO: 0.05 K/UL
BASOPHILS NFR BLD: 0.3 %
BILIRUB SERPL-MCNC: 0.8 MG/DL
BUN SERPL-MCNC: 31 MG/DL
CALCIUM SERPL-MCNC: 7.7 MG/DL
CHLORIDE SERPL-SCNC: 106 MMOL/L
CO2 SERPL-SCNC: 19 MMOL/L
CREAT SERPL-MCNC: 2.1 MG/DL
DIFFERENTIAL METHOD: ABNORMAL
EOSINOPHIL # BLD AUTO: 0.2 K/UL
EOSINOPHIL NFR BLD: 1 %
ERYTHROCYTE [DISTWIDTH] IN BLOOD BY AUTOMATED COUNT: 13.6 %
EST. GFR  (AFRICAN AMERICAN): 36.8 ML/MIN/1.73 M^2
EST. GFR  (NON AFRICAN AMERICAN): 31.8 ML/MIN/1.73 M^2
GLUCOSE SERPL-MCNC: 88 MG/DL
HCT VFR BLD AUTO: 32.4 %
HGB BLD-MCNC: 10.9 G/DL
IMM GRANULOCYTES # BLD AUTO: 0.07 K/UL
IMM GRANULOCYTES NFR BLD AUTO: 0.5 %
INR PPP: 1.1
LYMPHOCYTES # BLD AUTO: 2.2 K/UL
LYMPHOCYTES NFR BLD: 14.5 %
MAGNESIUM SERPL-MCNC: 1.8 MG/DL
MCH RBC QN AUTO: 29.6 PG
MCHC RBC AUTO-ENTMCNC: 33.6 G/DL
MCV RBC AUTO: 88 FL
MONOCYTES # BLD AUTO: 1.5 K/UL
MONOCYTES NFR BLD: 10 %
NEUTROPHILS # BLD AUTO: 11 K/UL
NEUTROPHILS NFR BLD: 73.7 %
NRBC BLD-RTO: 0 /100 WBC
PHOSPHATE SERPL-MCNC: 3.7 MG/DL
PLATELET # BLD AUTO: 160 K/UL
PMV BLD AUTO: 9.5 FL
POTASSIUM SERPL-SCNC: 4.3 MMOL/L
PROT SERPL-MCNC: 6.4 G/DL
PROTHROMBIN TIME: 11.8 SEC
RBC # BLD AUTO: 3.68 M/UL
SODIUM SERPL-SCNC: 133 MMOL/L
WBC # BLD AUTO: 14.86 K/UL

## 2018-06-01 PROCEDURE — 63600175 PHARM REV CODE 636 W HCPCS: Performed by: SURGERY

## 2018-06-01 PROCEDURE — 25000003 PHARM REV CODE 250: Performed by: SURGERY

## 2018-06-01 PROCEDURE — 94761 N-INVAS EAR/PLS OXIMETRY MLT: CPT

## 2018-06-01 PROCEDURE — 94799 UNLISTED PULMONARY SVC/PX: CPT

## 2018-06-01 PROCEDURE — 25000242 PHARM REV CODE 250 ALT 637 W/ HCPCS: Performed by: SURGERY

## 2018-06-01 PROCEDURE — 20600001 HC STEP DOWN PRIVATE ROOM

## 2018-06-01 PROCEDURE — 83735 ASSAY OF MAGNESIUM: CPT

## 2018-06-01 PROCEDURE — 85730 THROMBOPLASTIN TIME PARTIAL: CPT

## 2018-06-01 PROCEDURE — 25000003 PHARM REV CODE 250: Performed by: PSYCHIATRY & NEUROLOGY

## 2018-06-01 PROCEDURE — 85610 PROTHROMBIN TIME: CPT

## 2018-06-01 PROCEDURE — S4991 NICOTINE PATCH NONLEGEND: HCPCS | Performed by: PSYCHIATRY & NEUROLOGY

## 2018-06-01 PROCEDURE — 99232 SBSQ HOSP IP/OBS MODERATE 35: CPT | Mod: ,,, | Performed by: PHYSICIAN ASSISTANT

## 2018-06-01 PROCEDURE — 85025 COMPLETE CBC W/AUTO DIFF WBC: CPT

## 2018-06-01 PROCEDURE — 84100 ASSAY OF PHOSPHORUS: CPT

## 2018-06-01 PROCEDURE — 80053 COMPREHEN METABOLIC PANEL: CPT

## 2018-06-01 PROCEDURE — 25000003 PHARM REV CODE 250: Performed by: PHYSICIAN ASSISTANT

## 2018-06-01 RX ORDER — HYDROCODONE BITARTRATE AND ACETAMINOPHEN 7.5; 325 MG/1; MG/1
1 TABLET ORAL EVERY 6 HOURS PRN
Status: DISCONTINUED | OUTPATIENT
Start: 2018-06-01 | End: 2018-06-02 | Stop reason: HOSPADM

## 2018-06-01 RX ORDER — TAMSULOSIN HYDROCHLORIDE 0.4 MG/1
0.4 CAPSULE ORAL DAILY
Status: DISCONTINUED | OUTPATIENT
Start: 2018-06-01 | End: 2018-06-02 | Stop reason: HOSPADM

## 2018-06-01 RX ORDER — ASPIRIN 81 MG/1
81 TABLET ORAL DAILY
Status: DISCONTINUED | OUTPATIENT
Start: 2018-06-02 | End: 2018-06-02 | Stop reason: HOSPADM

## 2018-06-01 RX ORDER — HEPARIN SODIUM 5000 [USP'U]/ML
5000 INJECTION, SOLUTION INTRAVENOUS; SUBCUTANEOUS EVERY 8 HOURS
Status: DISCONTINUED | OUTPATIENT
Start: 2018-06-01 | End: 2018-06-02 | Stop reason: HOSPADM

## 2018-06-01 RX ORDER — ATORVASTATIN CALCIUM 10 MG/1
10 TABLET, FILM COATED ORAL DAILY
Status: DISCONTINUED | OUTPATIENT
Start: 2018-06-01 | End: 2018-06-02 | Stop reason: HOSPADM

## 2018-06-01 RX ADMIN — HYDROCODONE BITARTRATE AND ACETAMINOPHEN 1 TABLET: 7.5; 325 TABLET ORAL at 06:06

## 2018-06-01 RX ADMIN — NICOTINE 1 PATCH: 21 PATCH, EXTENDED RELEASE TRANSDERMAL at 08:06

## 2018-06-01 RX ADMIN — MUPIROCIN 1 G: 20 OINTMENT TOPICAL at 10:06

## 2018-06-01 RX ADMIN — LABETALOL HYDROCHLORIDE 10 MG: 5 INJECTION, SOLUTION INTRAVENOUS at 06:06

## 2018-06-01 RX ADMIN — CYCLOBENZAPRINE HYDROCHLORIDE 5 MG: 5 TABLET, FILM COATED ORAL at 04:06

## 2018-06-01 RX ADMIN — HYDROCODONE BITARTRATE AND ACETAMINOPHEN 1 TABLET: 7.5; 325 TABLET ORAL at 12:06

## 2018-06-01 RX ADMIN — MUPIROCIN 1 G: 20 OINTMENT TOPICAL at 08:06

## 2018-06-01 RX ADMIN — ACETAMINOPHEN 1000 MG: 10 INJECTION, SOLUTION INTRAVENOUS at 06:06

## 2018-06-01 RX ADMIN — STANDARDIZED SENNA CONCENTRATE AND DOCUSATE SODIUM 1 TABLET: 8.6; 5 TABLET, FILM COATED ORAL at 11:06

## 2018-06-01 RX ADMIN — LABETALOL HYDROCHLORIDE 10 MG: 5 INJECTION, SOLUTION INTRAVENOUS at 01:06

## 2018-06-01 RX ADMIN — TIOTROPIUM BROMIDE 18 MCG: 18 CAPSULE ORAL; RESPIRATORY (INHALATION) at 08:06

## 2018-06-01 RX ADMIN — HYDROCODONE BITARTRATE AND ACETAMINOPHEN 1 TABLET: 7.5; 325 TABLET ORAL at 10:06

## 2018-06-01 RX ADMIN — ATORVASTATIN CALCIUM 10 MG: 10 TABLET, FILM COATED ORAL at 11:06

## 2018-06-01 RX ADMIN — TAMSULOSIN HYDROCHLORIDE 0.4 MG: 0.4 CAPSULE ORAL at 11:06

## 2018-06-01 RX ADMIN — HEPARIN SODIUM 5000 UNITS: 5000 INJECTION, SOLUTION INTRAVENOUS; SUBCUTANEOUS at 09:06

## 2018-06-01 RX ADMIN — BUPROPION HYDROCHLORIDE 75 MG: 75 TABLET, FILM COATED ORAL at 11:06

## 2018-06-01 RX ADMIN — BUPROPION HYDROCHLORIDE 75 MG: 75 TABLET, FILM COATED ORAL at 09:06

## 2018-06-01 NOTE — NURSING
Pt arrived to unit. No acute distress noted. Pt c/o of back pain. Pt oriented to room. Will continue to monitor closely.

## 2018-06-01 NOTE — PLAN OF CARE
Problem: Fall Risk (Adult)  Goal: Absence of Falls  Patient will demonstrate the desired outcomes by discharge/transition of care.   No falls during this shift.     Problem: Pain, Acute (Adult)  Goal: Identify Related Risk Factors and Signs and Symptoms  Related risk factors and signs and symptoms are identified upon initiation of Human Response Clinical Practice Guideline (CPG)   Pain medication administered upon request.

## 2018-06-01 NOTE — NURSING
Pt transferred to room 647A via wheelchair on portable cardiac monitoring and room air with wife present during transfer. Sent with all personal belongings. Report given to Juany, RN and RN at bedside to receive pt. Care assumed to [facility] team.

## 2018-06-01 NOTE — PROGRESS NOTES
[facility]  Vascular Surgery  Progress Note    Patient Name: Oli Chris  MRN: [id number]  Admission Date: 5/29/2018  Primary Care Provider: Anne Anaya MD    Subjective:     Interval History: some nausea with eating yesterday; drain clamped overnight with no issues    Post-Op Info:  Procedure(s) (LRB):  REPAIR THORACIC-AORTIC ANEURYSM-ENDOVASCULAR GRAFT (TAA) (N/A)  ULTRASOUND-INTRAVASCULAR (IVUS) (N/A)  closure  Large bore L CFA percutaneous closure     2 Days Post-Op       Medications:  Continuous Infusions:   morphine       Scheduled Meds:   buPROPion  75 mg Oral BID    mupirocin  1 g Nasal BID    nicotine  1 patch Transdermal Daily    senna-docusate 8.6-50 mg  1 tablet Oral Daily    tiotropium  18 mcg Inhalation Daily     PRN Meds:cyclobenzaprine, labetalol, naloxone, ondansetron, promethazine (PHENERGAN) IVPB, senna-docusate 8.6-50 mg     Objective:     Vital Signs (Most Recent):  Temp: 98.2 °F (36.8 °C) (06/01/18 0701)  Pulse: 76 (06/01/18 0900)  Resp: 15 (06/01/18 0900)  BP: (!) 163/88 (06/01/18 0900)  SpO2: (!) 92 % (06/01/18 0900) Vital Signs (24h Range):  Temp:  [98.2 °F (36.8 °C)-98.8 °F (37.1 °C)] 98.2 °F (36.8 °C)  Pulse:  [] 76  Resp:  [13-28] 15  SpO2:  [92 %-95 %] 92 %  BP: (150-181)/() 163/88  Arterial Line BP: (110-182)/() 152/95       Date 06/01/18 0700 - 06/02/18 0659   Shift [id number] 2306-3117 1309-3713 24 Hour Total   I  N  T  A  K  E   I.V.  (mL/kg) 218.8  (3.7)   218.8  (3.7)    Shift Total  (mL/kg) 218.8  (3.7)   218.8  (3.7)   O  U  T  P  U  T   Urine  (mL/kg/hr) 360   360    Shift Total  (mL/kg) 360  (6)   360  (6)   Weight (kg) 59.7 59.7 59.7 59.7       Physical Exam   Constitutional: He appears well-developed and well-nourished.   Cardiovascular: Normal rate and regular rhythm.    Biphasic DP and PT   Pulmonary/Chest: Effort normal. No respiratory distress.   Abdominal: Soft. He exhibits no distension. There is no tenderness.    Musculoskeletal:   Bilateral groin soft, no hematoma   Neurological: He is alert.   5/5 strength bilateral upper and lower extremities   Skin: Skin is warm and dry.       Significant Labs:  CBC:   Recent Labs  Lab 06/01/18 0309   WBC 14.86*   RBC 3.68*   HGB 10.9*   HCT 32.4*      MCV 88   MCH 29.6   MCHC 33.6     CMP:   Recent Labs  Lab 06/01/18 0309   GLU 88   CALCIUM 7.7*   ALBUMIN 2.6*   PROT 6.4   *   K 4.3   CO2 19*      BUN 31*   CREATININE 2.1*   ALKPHOS 139*   ALT 5*   AST 24   BILITOT 0.8     Coagulation:   Recent Labs  Lab 06/01/18 0309   LABPROT 11.8   INR 1.1   APTT 27.1     Assessment/Plan:     CKD (chronic kidney disease) stage 4, GFR 15-29 ml/min    Discontinue hu        Thoracoabdominal aortic aneurysm, without rupture    Oli Chris is a 68 y.o. male with a large thoraco-abdominal aortic aneurysm (8.5cm in greatest dimension) who presented for pre-op hydration    Regular diet, as tolerated given patient needs to lay flat  Lumbar drain removed after tolerating clamping overnight  q 4 hour neuro checks; will downgrade to stepdown unit  Pulmonary toilet  Discontinue hu catheter; arterial line  DVT ppx once drain out; ASA 81 mg po daily starting tomorrow            Noemy Obrien MD  Vascular Surgery  [facility]

## 2018-06-01 NOTE — SUBJECTIVE & OBJECTIVE
Medications:  Continuous Infusions:   morphine       Scheduled Meds:   buPROPion  75 mg Oral BID    mupirocin  1 g Nasal BID    nicotine  1 patch Transdermal Daily    senna-docusate 8.6-50 mg  1 tablet Oral Daily    tiotropium  18 mcg Inhalation Daily     PRN Meds:cyclobenzaprine, labetalol, naloxone, ondansetron, promethazine (PHENERGAN) IVPB, senna-docusate 8.6-50 mg     Objective:     Vital Signs (Most Recent):  Temp: 98.2 °F (36.8 °C) (06/01/18 0701)  Pulse: 76 (06/01/18 0900)  Resp: 15 (06/01/18 0900)  BP: (!) 163/88 (06/01/18 0900)  SpO2: (!) 92 % (06/01/18 0900) Vital Signs (24h Range):  Temp:  [98.2 °F (36.8 °C)-98.8 °F (37.1 °C)] 98.2 °F (36.8 °C)  Pulse:  [] 76  Resp:  [13-28] 15  SpO2:  [92 %-95 %] 92 %  BP: (150-181)/() 163/88  Arterial Line BP: (110-182)/() 152/95       Date 06/01/18 0700 - 06/02/18 0659   Shift [id number] 7844-7788 4694-9507 24 Hour Total   I  N  T  A  K  E   I.V.  (mL/kg) 218.8  (3.7)   218.8  (3.7)    Shift Total  (mL/kg) 218.8  (3.7)   218.8  (3.7)   O  U  T  P  U  T   Urine  (mL/kg/hr) 360   360    Shift Total  (mL/kg) 360  (6)   360  (6)   Weight (kg) 59.7 59.7 59.7 59.7       Physical Exam   Constitutional: He appears well-developed and well-nourished.   Cardiovascular: Normal rate and regular rhythm.    Biphasic DP and PT   Pulmonary/Chest: Effort normal. No respiratory distress.   Abdominal: Soft. He exhibits no distension. There is no tenderness.   Musculoskeletal:   Bilateral groin soft, no hematoma   Neurological: He is alert.   5/5 strength bilateral upper and lower extremities   Skin: Skin is warm and dry.       Significant Labs:  CBC:   Recent Labs  Lab 06/01/18  0309   WBC 14.86*   RBC 3.68*   HGB 10.9*   HCT 32.4*      MCV 88   MCH 29.6   MCHC 33.6     CMP:   Recent Labs  Lab 06/01/18 0309   GLU 88   CALCIUM 7.7*   ALBUMIN 2.6*   PROT 6.4   *   K 4.3   CO2 19*      BUN 31*   CREATININE 2.1*   ALKPHOS 139*   ALT 5*   AST 24    BILITOT 0.8     Coagulation:   Recent Labs  Lab 06/01/18  0309   LABPROT 11.8   INR 1.1   APTT 27.1

## 2018-06-01 NOTE — ASSESSMENT & PLAN NOTE
Oli Chris is a 68 y.o. male with a large thoraco-abdominal aortic aneurysm (8.5cm in greatest dimension) who presented for pre-op hydration    Regular diet, as tolerated given patient needs to lay flat  Lumbar drain removed after tolerating clamping overnight  q 4 hour neuro checks; will downgrade to stepdown unit  Pulmonary toilet  Discontinue hu catheter; arterial line  DVT ppx once drain out; ASA 81 mg po daily starting tomorrow

## 2018-06-01 NOTE — ASSESSMENT & PLAN NOTE
POD2 s/p TEVAR and lumbar drain for 8.5 cm thoracic and aortic aneurysm  -- Lumbar Drain clamped all night, tolerated well, removed this AM without complications   -- MAP goal >80, SBP goal <170  -- PCA pump for pain  -- TTF under Vascular Surgery

## 2018-06-01 NOTE — NURSING
Arterial line discontinued per Obi with MD verbal order. Mario cathter d/c per Camille HAY with Vascular verbal order to d/c once lumbar drain is removed.

## 2018-06-01 NOTE — PROGRESS NOTES
[facility]  Neurocritical Care  Progress Note    Admit Date: 5/29/2018  Service Date: 06/01/2018  Length of Stay: 3    Subjective:     Chief Complaint: Thoracic aortic aneurysm    History of Present Illness: Mr. Chris is a 68 year old male with a PMHx of CKD Stage IV, HTN, chronic back pain, tobacco abuse who presents to Neuro Critical Care s/p TEVAR and lumbar drain for 8.5 cm thoracic and aortic aneurysm. Per the chart, patient visited PCP with throbbing lower back pain. He was evaluated with plain film and incidentally found to have thoracic aortic aneurysm. Patient is followed by Vascular Surgery. Patient will be admitted to Neuro Critical Care for a higher level of care.     Hospital Course: 5/30: POD0 s/p TEVAR and lumbar drain for 8.5 cm thoracic and aortic aneurysm. Admit to [facility]  5/31: Vascular Surgery to clamp drain in evening.   6/1: LD removed in AM. Tolerated well. TTF     Interval History: LD clamped overnight. Tolerated well. LD removed this morning by Vascular Surgery. Increasing PO intake, will DC fluids and hu now that drain is out. Stepdown to floor under Vascular Surgery.     Review of Systems: Denies HA, chest pain, abdominal pain, shortness of breath, blurred vision, nausea, vomiting, weakness.     Vitals:   Temp: 98.2 °F (36.8 °C)  Pulse: 77  Rhythm: normal sinus rhythm  BP: (!) 161/89  MAP (mmHg): 116  ICP Mean (mmHg): 17 mmHg  Resp: 19  SpO2: (!) 92 %  O2 Device (Oxygen Therapy): room air    Temp  Min: 98.2 °F (36.8 °C)  Max: 98.8 °F (37.1 °C)  Pulse  Min: 70  Max: 101  BP  Min: 157/88  Max: 185/97  MAP (mmHg)  Min: 113  Max: 130  ICP Mean (mmHg)  Min: 17 mmHg  Max: 18 mmHg  Resp  Min: 13  Max: 28  SpO2  Min: 92 %  Max: 95 %    05/31 0701 - 06/01 0700  In: 2326.3 [I.V.:1976.3]  Out: 1947 [Urine:1295; Drains:152]   Unmeasured Output  Stool Occurrence: 0  Emesis Occurrence: 1        Examination:   Constitutional: Well-nourished and -developed. No apparent  distress.   Eyes: Conjunctiva clear, anicteric. Lids no lesions.  Head/Ears/Nose/Mouth/Throat/Neck: Moist mucous membranes. External ears, nose atraumatic.   Cardiovascular: Regular rhythm. No murmurs. No leg edema.  Respiratory: Comfortable respirations. Clear to auscultation.  Gastrointestinal: No hernia. Soft, nondistended, nontender. + bowel sounds.     Neurologic:  -GCS [id number]  -Alert. Oriented to person, place, and time. Speech fluent. Follows commands.  -Cranial nerves intact  -Motor 5/5 in all four extremities   -Sensation intact      Medications:   Continuous Scheduled  [START ON 6/2/2018] aspirin 81 mg Daily   atorvastatin 10 mg Daily   buPROPion 75 mg BID   heparin (porcine) 5,000 Units Q8H   mupirocin 1 g BID   nicotine 1 patch Daily   senna-docusate 8.6-50 mg 1 tablet Daily   tamsulosin 0.4 mg Daily   tiotropium 18 mcg Daily   PRN  cyclobenzaprine 5 mg TID PRN   HYDROcodone-acetaminophen 1 tablet Q6H PRN   labetalol 10 mg Q6H PRN   ondansetron 8 mg Q8H PRN   promethazine (PHENERGAN) IVPB 6.25 mg Q6H PRN   senna-docusate 8.6-50 mg 1 tablet Daily PRN      Today I independently reviewed pertinent medications, lines/drains/airways, lab results,    Assessment/Plan:     Cardiac/Vascular   * Thoracic aortic aneurysm    POD2 s/p TEVAR and lumbar drain for 8.5 cm thoracic and aortic aneurysm  -- Lumbar Drain clamped all night, tolerated well, removed this AM without complications   -- MAP goal >80, SBP goal <170  -- PCA pump for pain  -- TTF under Vascular Surgery         Essential hypertension    -- MAP >80, SBP <170  -- Holding home lisinopril at this time while MAP goal >80  -- PRN labetalol         Renal/   CKD (chronic kidney disease) stage 4, GFR 15-29 ml/min    -- Creatinine at baseline  -- Daily CMP  -- Discontinue IVF, hu         Other   Cigarette nicotine dependence without complication    - Smoking cessation counseling   - Nicotine 21 mg/24hr patch in place            Prophylaxis:  Venous  Thromboembolism: mechanical chemical  Stress Ulcer: NA  Ventilator Pneumonia: not applicable     Activity Orders          None        Full Code    Anabelle Rubio PA-C  Neurocritical Care  [facility]

## 2018-06-01 NOTE — PROGRESS NOTES
Per Mihindu with vascular surgery, No need to trend ICPs. Continue to keep lumbar drain clamped and monitor for any neurovascular changes at this time. Will continue to monitor closely.

## 2018-06-01 NOTE — NURSING
Fitz HAY with Vascular at bedside to remove pt lumbar drain, drain was clamped over night. MD stated to have pt remain flat for two hours and then pt may sit up with no HOB restrictions. Pt denies, numbness and tingling. Will continue to monitor and notify as needed.

## 2018-06-01 NOTE — PLAN OF CARE
Problem: Patient Care Overview  Goal: Plan of Care Review  Outcome: Ongoing (interventions implemented as appropriate)  POC reviewed with pt and wife at 0500. Pt and wife verbalized understanding. Questions and concerns addressed. No acute events overnight. Pt progressing toward goals. Will continue to monitor. See flowsheets for full assessment and VS info

## 2018-06-02 VITALS
TEMPERATURE: 97 F | SYSTOLIC BLOOD PRESSURE: 165 MMHG | HEART RATE: 103 BPM | RESPIRATION RATE: 18 BRPM | DIASTOLIC BLOOD PRESSURE: 103 MMHG | BODY MASS INDEX: 19.5 KG/M2 | WEIGHT: 131.63 LBS | OXYGEN SATURATION: 93 % | HEIGHT: 69 IN

## 2018-06-02 LAB
ALBUMIN SERPL BCP-MCNC: 2.6 G/DL
ALP SERPL-CCNC: 179 U/L
ALT SERPL W/O P-5'-P-CCNC: 5 U/L
ANION GAP SERPL CALC-SCNC: 9 MMOL/L
APTT BLDCRRT: 27.7 SEC
AST SERPL-CCNC: 26 U/L
BASOPHILS # BLD AUTO: 0.08 K/UL
BASOPHILS NFR BLD: 0.6 %
BILIRUB SERPL-MCNC: 1.1 MG/DL
BLD PROD TYP BPU: NORMAL
BLD PROD TYP BPU: NORMAL
BLOOD UNIT EXPIRATION DATE: NORMAL
BLOOD UNIT EXPIRATION DATE: NORMAL
BLOOD UNIT TYPE CODE: 7300
BLOOD UNIT TYPE CODE: 7300
BLOOD UNIT TYPE: NORMAL
BLOOD UNIT TYPE: NORMAL
BUN SERPL-MCNC: 34 MG/DL
CALCIUM SERPL-MCNC: 8.6 MG/DL
CHLORIDE SERPL-SCNC: 104 MMOL/L
CO2 SERPL-SCNC: 20 MMOL/L
CODING SYSTEM: NORMAL
CODING SYSTEM: NORMAL
CREAT SERPL-MCNC: 2.1 MG/DL
DIFFERENTIAL METHOD: ABNORMAL
DISPENSE STATUS: NORMAL
DISPENSE STATUS: NORMAL
EOSINOPHIL # BLD AUTO: 0.5 K/UL
EOSINOPHIL NFR BLD: 4.1 %
ERYTHROCYTE [DISTWIDTH] IN BLOOD BY AUTOMATED COUNT: 13.5 %
EST. GFR  (AFRICAN AMERICAN): 36.8 ML/MIN/1.73 M^2
EST. GFR  (NON AFRICAN AMERICAN): 31.8 ML/MIN/1.73 M^2
GLUCOSE SERPL-MCNC: 86 MG/DL
HCT VFR BLD AUTO: 35.2 %
HGB BLD-MCNC: 11.6 G/DL
IMM GRANULOCYTES # BLD AUTO: 0.06 K/UL
IMM GRANULOCYTES NFR BLD AUTO: 0.5 %
INR PPP: 1.1
LYMPHOCYTES # BLD AUTO: 1.8 K/UL
LYMPHOCYTES NFR BLD: 14 %
MAGNESIUM SERPL-MCNC: 2 MG/DL
MCH RBC QN AUTO: 29.1 PG
MCHC RBC AUTO-ENTMCNC: 33 G/DL
MCV RBC AUTO: 88 FL
MONOCYTES # BLD AUTO: 1.2 K/UL
MONOCYTES NFR BLD: 9.2 %
NEUTROPHILS # BLD AUTO: 9 K/UL
NEUTROPHILS NFR BLD: 71.6 %
NRBC BLD-RTO: 0 /100 WBC
PHOSPHATE SERPL-MCNC: 2.9 MG/DL
PLATELET # BLD AUTO: 168 K/UL
PMV BLD AUTO: 9.8 FL
POTASSIUM SERPL-SCNC: 4.1 MMOL/L
PROT SERPL-MCNC: 6.3 G/DL
PROTHROMBIN TIME: 11.3 SEC
RBC # BLD AUTO: 3.99 M/UL
SODIUM SERPL-SCNC: 133 MMOL/L
TRANS ERYTHROCYTES VOL PATIENT: NORMAL ML
TRANS ERYTHROCYTES VOL PATIENT: NORMAL ML
WBC # BLD AUTO: 12.53 K/UL

## 2018-06-02 PROCEDURE — 85025 COMPLETE CBC W/AUTO DIFF WBC: CPT

## 2018-06-02 PROCEDURE — 84100 ASSAY OF PHOSPHORUS: CPT

## 2018-06-02 PROCEDURE — 36415 COLL VENOUS BLD VENIPUNCTURE: CPT

## 2018-06-02 PROCEDURE — 85610 PROTHROMBIN TIME: CPT

## 2018-06-02 PROCEDURE — 25000003 PHARM REV CODE 250: Performed by: PHYSICIAN ASSISTANT

## 2018-06-02 PROCEDURE — 25000003 PHARM REV CODE 250: Performed by: SURGERY

## 2018-06-02 PROCEDURE — S4991 NICOTINE PATCH NONLEGEND: HCPCS | Performed by: PSYCHIATRY & NEUROLOGY

## 2018-06-02 PROCEDURE — 63600175 PHARM REV CODE 636 W HCPCS: Performed by: SURGERY

## 2018-06-02 PROCEDURE — 25000242 PHARM REV CODE 250 ALT 637 W/ HCPCS: Performed by: SURGERY

## 2018-06-02 PROCEDURE — 85730 THROMBOPLASTIN TIME PARTIAL: CPT

## 2018-06-02 PROCEDURE — 25000003 PHARM REV CODE 250: Performed by: PSYCHIATRY & NEUROLOGY

## 2018-06-02 PROCEDURE — 80053 COMPREHEN METABOLIC PANEL: CPT

## 2018-06-02 PROCEDURE — 83735 ASSAY OF MAGNESIUM: CPT

## 2018-06-02 RX ORDER — BUPROPION HYDROCHLORIDE 75 MG/1
75 TABLET ORAL 2 TIMES DAILY
Qty: 60 TABLET | Refills: 11 | Status: SHIPPED | OUTPATIENT
Start: 2018-06-02 | End: 2019-06-02

## 2018-06-02 RX ORDER — ASPIRIN 81 MG/1
81 TABLET ORAL DAILY
Refills: 0 | COMMUNITY
Start: 2018-06-03 | End: 2019-06-03

## 2018-06-02 RX ORDER — AMOXICILLIN 250 MG
1 CAPSULE ORAL DAILY PRN
COMMUNITY
Start: 2018-06-02

## 2018-06-02 RX ORDER — IBUPROFEN 200 MG
1 TABLET ORAL DAILY
Qty: 30 PATCH | Refills: 11 | Status: SHIPPED | OUTPATIENT
Start: 2018-06-03

## 2018-06-02 RX ORDER — CYCLOBENZAPRINE HCL 5 MG
5 TABLET ORAL 3 TIMES DAILY PRN
Qty: 30 TABLET | Refills: 0 | Status: SHIPPED | OUTPATIENT
Start: 2018-06-02 | End: 2018-06-12

## 2018-06-02 RX ORDER — LISINOPRIL 5 MG/1
10 TABLET ORAL DAILY
Status: DISCONTINUED | OUTPATIENT
Start: 2018-06-02 | End: 2018-06-02 | Stop reason: HOSPADM

## 2018-06-02 RX ORDER — ATORVASTATIN CALCIUM 10 MG/1
10 TABLET, FILM COATED ORAL DAILY
Qty: 90 TABLET | Refills: 3 | Status: SHIPPED | OUTPATIENT
Start: 2018-06-03 | End: 2019-06-03

## 2018-06-02 RX ORDER — HYDROCODONE BITARTRATE AND ACETAMINOPHEN 7.5; 325 MG/1; MG/1
1 TABLET ORAL EVERY 4 HOURS PRN
Qty: 21 TABLET | Refills: 0 | Status: SHIPPED | OUTPATIENT
Start: 2018-06-02

## 2018-06-02 RX ADMIN — ASPIRIN 81 MG: 81 TABLET, COATED ORAL at 10:06

## 2018-06-02 RX ADMIN — HYDROCODONE BITARTRATE AND ACETAMINOPHEN 1 TABLET: 7.5; 325 TABLET ORAL at 10:06

## 2018-06-02 RX ADMIN — HYDROCODONE BITARTRATE AND ACETAMINOPHEN 1 TABLET: 7.5; 325 TABLET ORAL at 04:06

## 2018-06-02 RX ADMIN — ATORVASTATIN CALCIUM 10 MG: 10 TABLET, FILM COATED ORAL at 10:06

## 2018-06-02 RX ADMIN — LABETALOL HYDROCHLORIDE 10 MG: 5 INJECTION, SOLUTION INTRAVENOUS at 01:06

## 2018-06-02 RX ADMIN — STANDARDIZED SENNA CONCENTRATE AND DOCUSATE SODIUM 1 TABLET: 8.6; 5 TABLET, FILM COATED ORAL at 10:06

## 2018-06-02 RX ADMIN — MUPIROCIN 1 G: 20 OINTMENT TOPICAL at 10:06

## 2018-06-02 RX ADMIN — TAMSULOSIN HYDROCHLORIDE 0.4 MG: 0.4 CAPSULE ORAL at 10:06

## 2018-06-02 RX ADMIN — LABETALOL HYDROCHLORIDE 10 MG: 5 INJECTION, SOLUTION INTRAVENOUS at 05:06

## 2018-06-02 RX ADMIN — HEPARIN SODIUM 5000 UNITS: 5000 INJECTION, SOLUTION INTRAVENOUS; SUBCUTANEOUS at 06:06

## 2018-06-02 RX ADMIN — TIOTROPIUM BROMIDE 18 MCG: 18 CAPSULE ORAL; RESPIRATORY (INHALATION) at 09:06

## 2018-06-02 RX ADMIN — LISINOPRIL 10 MG: 5 TABLET ORAL at 11:06

## 2018-06-02 RX ADMIN — BUPROPION HYDROCHLORIDE 75 MG: 75 TABLET, FILM COATED ORAL at 10:06

## 2018-06-02 RX ADMIN — NICOTINE 1 PATCH: 21 PATCH, EXTENDED RELEASE TRANSDERMAL at 10:06

## 2018-06-02 NOTE — DISCHARGE SUMMARY
[facility]  Department of Surgery  Discharge Summary    Patient Name: Oli Chris  MRN: [id number]  Admission Date: 5/29/2018  Hospital Length of Stay: 4 days  Discharge Date and Time:  06/02/2018 1400  Attending Physician: LUIS ALBERTO Moss III, MD   Discharging Provider: Ranjeet Colindres MD  Primary Care Provider: Anne Anaya MD    HPI:   Patient is a 68 y.o. male with a h/o HTN, chronic back pain, tobacco abuse who is here today for evaluation of synchronous thoracic and aortic aneurysm.   Patient visited PCP with throbbing lower back pain. He was evaluated with plain film and incidentally found to have thoracic aortic aneurysm.   Seen by Dr. Gomez in [place] and was referred for evaluation of 8.5 cm thoracic aortic aneurysm. Patient denies upper back pain.      Patient was evaluated with non-contrasted CT due to Cr. Of 1.9.     Notably, this throbbing lumbar back pain has been present ~6-8 wks, sometimes radiating down the R leg.  Has been medicating with high dose NSAIDS.   Also drinks several POTS of cofee daily.     Interval:  He was admitted ahead of TEVAR for increased Cr and dehydration. He denies recent F/C, illness, nausea, vomiting, SOB, lightheadedness or D/C.    Procedure(s) (LRB):  REPAIR THORACIC-AORTIC ANEURYSM-ENDOVASCULAR GRAFT (TAA) (N/A)  ULTRASOUND-INTRAVASCULAR (IVUS) (N/A)  closure  Large bore L CFA percutaneous closure        Hospital Course:   The patient underwent the above procedure on 5/30/18. He did well post operatively and by 3 Days Post-Op he was meeting discharge criteria. On POD 2 he was stepped down to the floor and the spinal drain removed, he remained stable on the floor until the following day with stable vital signs and laboratory results. Prior to discharge, the patient had adequate pain control and was tolerating PO without difficulty. The patient was discharged home in good condition with the below medications and instructions. F/u in 3mo with  non-con CT c/a/p and BMP     Pex:  A&O, NAD  RRR  No resp Distress  Access site dressings removed c/d/i without ecchymosis or swelling  ABD: s/nt/nd    Significant Diagnostic Studies: Labs:   CMP   Recent Labs  Lab 06/01/18  0309 06/02/18  0438   * 133*   K 4.3 4.1    104   CO2 19* 20*   GLU 88 86   BUN 31* 34*   CREATININE 2.1* 2.1*   CALCIUM 7.7* 8.6*   PROT 6.4 6.3   ALBUMIN 2.6* 2.6*   BILITOT 0.8 1.1*   ALKPHOS 139* 179*   AST 24 26   ALT 5* 5*   ANIONGAP 8 9   ESTGFRAFRICA 36.8* 36.8*   EGFRNONAA 31.8* 31.8*    and CBC   Recent Labs  Lab 06/01/18 0309 06/02/18  0438   WBC 14.86* 12.53   HGB 10.9* 11.6*   HCT 32.4* 35.2*    168       Pending Diagnostic Studies:     None        Final Active Diagnoses:    Diagnosis Date Noted POA    PRINCIPAL PROBLEM:  Thoracic aortic aneurysm [I71.2] 05/30/2018 Yes    Pain [R52] 05/31/2018 Yes    Anxiety [F41.9] 05/31/2018 Yes    Descending thoracic aortic aneurysm [I71.2] 05/30/2018 Yes    Dehydration [E86.0] 05/29/2018 Yes    Pulmonary emphysema [J43.9] 05/22/2018 Yes     Chronic    Thoracoabdominal aortic aneurysm, without rupture [I71.6] 05/11/2018 Yes    CKD (chronic kidney disease) stage 4, GFR 15-29 ml/min [N18.4] 05/11/2018 Yes     Chronic    Cigarette nicotine dependence without complication [F17.210] 05/11/2018 Yes    Essential hypertension [I10] 05/11/2018 Yes      Problems Resolved During this Admission:    Diagnosis Date Noted Date Resolved POA      No new Assessment & Plan notes have been filed under this hospital service since the last note was generated.  Service: Vascular Surgery      Discharged Condition: good    Disposition: Home or Self Care    Follow Up:  Follow-up Information     LUIS ALBERTO Moss Iii, MD. Schedule an appointment as soon as possible for a visit in 3 months.    Specialty:  Vascular Surgery  Why:  With CT scan and labs prior to visit   Contact information:  [address]  [facility]  [address]  [phone]                 Patient Instructions:     Diet renal     Call MD for:  temperature >100.4     Call MD for:  persistent nausea and vomiting or diarrhea     Call MD for:  severe uncontrolled pain     Call MD for:  difficulty breathing or increased cough     Activity as tolerated     Shower on day dressing removed (No bath)   Order Comments: Ok to shower, avoid soaking in tub or pool for at least 2 weeks     Lifting restrictions   Order Comments: No heavy lifting or straining until cleared in clinic     No dressing needed       Medications:  Reconciled Home Medications:      Medication List      START taking these medications    aspirin 81 MG EC tablet  Commonly known as:  ECOTRIN  Take 1 tablet (81 mg total) by mouth once daily.  Start taking on:  6/3/2018     atorvastatin 10 MG tablet  Commonly known as:  LIPITOR  Take 1 tablet (10 mg total) by mouth once daily.  Start taking on:  6/3/2018     buPROPion 75 MG tablet  Commonly known as:  WELLBUTRIN  Take 1 tablet (75 mg total) by mouth 2 (two) times daily.     cyclobenzaprine 5 MG tablet  Commonly known as:  FLEXERIL  Take 1 tablet (5 mg total) by mouth 3 (three) times daily as needed for Muscle spasms.     nicotine 21 mg/24 hr  Commonly known as:  NICODERM CQ  Place 1 patch onto the skin once daily.  Start taking on:  6/3/2018     senna-docusate 8.6-50 mg 8.6-50 mg per tablet  Commonly known as:  PERICOLACE  Take 1 tablet by mouth daily as needed for Constipation.        CHANGE how you take these medications    HYDROcodone-acetaminophen 7.5-325 mg per tablet  Commonly known as:  NORCO  Take 1 tablet by mouth every 4 (four) hours as needed for Pain.  What changed:  when to take this        CONTINUE taking these medications    lisinopril 10 MG tablet  Take 10 mg by mouth.     tiotropium 18 mcg inhalation capsule  Commonly known as:  SPIRIVA  Inhale 18 mcg into the lungs once daily. Controller          Time spent on the discharge of patient: 10  minutes    Ranjeet Colindres MD  Department of Surgery   [facility]

## 2018-06-02 NOTE — NURSING
Patient education provided with patient and family, medications reconciled, patient escorted out via w/c.

## 2018-06-02 NOTE — PLAN OF CARE
Problem: Patient Care Overview  Goal: Plan of Care Review  Outcome: Ongoing (interventions implemented as appropriate)  POC reviewed with patient and spouse; understanding verbalized. AAOX4. VSS during shift. Pain being managed with pain meds. IV labetalol given for BP of 172/88. Tele monitoring running NSR. No falls or injuries during shift. Bed in low position, call light in reach. Nurse will continue to monitor.

## 2018-06-15 ENCOUNTER — TELEPHONE (OUTPATIENT)
Dept: VASCULAR SURGERY | Facility: CLINIC | Age: 68
End: 2018-06-15

## 2018-06-15 DIAGNOSIS — I71.60 THORACOABDOMINAL AORTIC ANEURYSM (TAAA) WITHOUT RUPTURE: Primary | ICD-10-CM

## 2018-06-15 NOTE — TELEPHONE ENCOUNTER
Patient's wife called to schedule FU appt after hospital discharge. Appt for imaging and clinic visit with Dr. Moss scheduled. Patient's wife confirms appt times. Appt letter placed in mail.

## 2018-06-29 ENCOUNTER — TELEPHONE (OUTPATIENT)
Dept: VASCULAR SURGERY | Facility: CLINIC | Age: 68
End: 2018-06-29

## 2018-06-29 DIAGNOSIS — Z01.818 PRE-OP EVALUATION: Primary | ICD-10-CM

## 2018-07-13 ENCOUNTER — TELEPHONE (OUTPATIENT)
Dept: VASCULAR SURGERY | Facility: CLINIC | Age: 68
End: 2018-07-13

## 2018-07-13 NOTE — TELEPHONE ENCOUNTER
Patient's wife called stating that her  has been admitted to home hospice care. States she unsure whether or not he will be able to make appt with Dr. Moss on Tuesday 7/17/18. Notified patient's wife I would make Dr. Moss aware of patient's condition and will contact her with Dr. Moss's response. Patient's wife verbalizes understanding.

## 2018-07-16 ENCOUNTER — TELEPHONE (OUTPATIENT)
Dept: VASCULAR SURGERY | Facility: CLINIC | Age: 68
End: 2018-07-16

## 2018-08-21 ENCOUNTER — TELEPHONE (OUTPATIENT)
Dept: VASCULAR SURGERY | Facility: CLINIC | Age: 68
End: 2018-08-21

## 2018-08-21 NOTE — TELEPHONE ENCOUNTER
Patient's wife called stating patient passed away at the beginning of August. States patient had lost a lot of weight and had not had BM since prior to surgery. States she suspects he had colon cancer due to weight loss, lack of BM, and change in skin color. States skin was turning yellow. States she would like to speak with Dr. Moss when he is available. Will give message to Dr. Moss. Condolences of loss of  given to patient. Will andria chart as patient .

## 2018-08-21 NOTE — TELEPHONE ENCOUNTER
"Contacted patient with Dr. Moss's response that he has reviewed patient's "CT scans, and there was no suggestion of colon cancer or any other malignancy." Patient's wife states she understands and is just trying to find answers as to the reason for his death.   "

## (undated) DEVICE — SYR 30CC LUER LOCK

## (undated) DEVICE — TUBING CNTRST INJ ADPT 72IN

## (undated) DEVICE — ELECTRODE REM PLYHSV RETURN 9

## (undated) DEVICE — GUIDEWIRE AMPLATZ STIFF 260X7

## (undated) DEVICE — CATH PIGTAIL

## (undated) DEVICE — INTRODUCER VASC RADPQ 5FRX10CM

## (undated) DEVICE — GUIDEWIRE ANG STF .035INX18CM

## (undated) DEVICE — GUIDE WIRE LUNDERQUIST X-STIFF

## (undated) DEVICE — SPONGE GAUZE 16PLY 4X4

## (undated) DEVICE — Device

## (undated) DEVICE — TUBING HIGH PRESSURE

## (undated) DEVICE — INTRODUCER VASC RADPQ 6FRX10CM

## (undated) DEVICE — STOPCOCK 1 WAY PLASTIC

## (undated) DEVICE — SPONGE DERMACEA 4X4IN 12PLY

## (undated) DEVICE — WIRE LUNDERQUIST 260

## (undated) DEVICE — SET DECANTER MEDICHOICE

## (undated) DEVICE — TRAY FOLEY 16FR INFECTION CONT

## (undated) DEVICE — CATH TORCOON NB ADV DAV

## (undated) DEVICE — CATH VALVE BALLOON 23X4

## (undated) DEVICE — DEVICE CLOSURE MYNX GRIP 5FR

## (undated) DEVICE — INTRODUCER VASC RADPQ 10FRX10C

## (undated) DEVICE — DILATOR SET 16/18

## (undated) DEVICE — SYR MED RAD 150ML

## (undated) DEVICE — DRESSING TRANS 4X4 TEGADERM

## (undated) DEVICE — COVER LIGHT HANDLE 80/CA

## (undated) DEVICE — PACK UNIVERSAL SPLIT II

## (undated) DEVICE — DRESSING TELFA STRL 4X3 LF

## (undated) DEVICE — SUT PROLENE 5-0 24 C-1 BL

## (undated) DEVICE — DILATOR SET 20/22

## (undated) DEVICE — PENCIL ROCKER SWITCH 10FT CORD

## (undated) DEVICE — SOL NS 1000CC

## (undated) DEVICE — COVER INSTR ELASTIC BAND 40X20

## (undated) DEVICE — DEVICE PERCLOSE SUT CLSR 6FR

## (undated) DEVICE — CATH ANGLED GLIDE CATH 4FR

## (undated) DEVICE — BOOT SUTURE AID

## (undated) DEVICE — CATH VISIONS PV IVUS .035

## (undated) DEVICE — TUBING SUCTION STERILE

## (undated) DEVICE — SEE MEDLINE ITEM 156911

## (undated) DEVICE — GUIDEWIRE STF .035X260CM ANG

## (undated) DEVICE — APPLICATOR CHLORAPREP ORN 26ML

## (undated) DEVICE — STAPLER SKIN PROXIMATE WIDE

## (undated) DEVICE — KIT INTRO MICRO NIT VSI 4FR

## (undated) DEVICE — VISE RADIFOCUS MULTI TORQUE